# Patient Record
Sex: MALE | Race: BLACK OR AFRICAN AMERICAN | NOT HISPANIC OR LATINO | Employment: FULL TIME | URBAN - METROPOLITAN AREA
[De-identification: names, ages, dates, MRNs, and addresses within clinical notes are randomized per-mention and may not be internally consistent; named-entity substitution may affect disease eponyms.]

---
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
## 2021-03-20 ENCOUNTER — IMMUNIZATION (OUTPATIENT)
Dept: PRIMARY CARE CLINIC | Facility: CLINIC | Age: 21
End: 2021-03-20

## 2021-03-20 DIAGNOSIS — Z23 NEED FOR VACCINATION: Primary | ICD-10-CM

## 2021-03-20 PROCEDURE — 91300 PR SARS-COV- 2 COVID-19 VACCINE, NO PRSV, 30MCG/0.3ML, IM: ICD-10-PCS | Mod: S$GLB,,, | Performed by: INTERNAL MEDICINE

## 2021-03-20 PROCEDURE — 91300 PR SARS-COV- 2 COVID-19 VACCINE, NO PRSV, 30MCG/0.3ML, IM: CPT | Mod: S$GLB,,, | Performed by: INTERNAL MEDICINE

## 2021-03-20 PROCEDURE — 0001A PR IMMUNIZ ADMIN, SARS-COV-2 COVID-19 VACC, 30MCG/0.3ML, 1ST DOSE: CPT | Mod: CV19,S$GLB,, | Performed by: INTERNAL MEDICINE

## 2021-03-20 PROCEDURE — 0001A PR IMMUNIZ ADMIN, SARS-COV-2 COVID-19 VACC, 30MCG/0.3ML, 1ST DOSE: ICD-10-PCS | Mod: CV19,S$GLB,, | Performed by: INTERNAL MEDICINE

## 2021-03-20 RX ADMIN — Medication 0.3 ML: at 08:03

## 2021-04-11 ENCOUNTER — IMMUNIZATION (OUTPATIENT)
Dept: PRIMARY CARE CLINIC | Facility: CLINIC | Age: 21
End: 2021-04-11
Payer: COMMERCIAL

## 2021-04-11 DIAGNOSIS — Z23 NEED FOR VACCINATION: Primary | ICD-10-CM

## 2021-04-11 PROCEDURE — 91300 PR SARS-COV- 2 COVID-19 VACCINE, NO PRSV, 30MCG/0.3ML, IM: CPT | Mod: S$GLB,,, | Performed by: INTERNAL MEDICINE

## 2021-04-11 PROCEDURE — 0002A PR IMMUNIZ ADMIN, SARS-COV-2 COVID-19 VACC, 30MCG/0.3ML, 2ND DOSE: ICD-10-PCS | Mod: CV19,S$GLB,, | Performed by: INTERNAL MEDICINE

## 2021-04-11 PROCEDURE — 91300 PR SARS-COV- 2 COVID-19 VACCINE, NO PRSV, 30MCG/0.3ML, IM: ICD-10-PCS | Mod: S$GLB,,, | Performed by: INTERNAL MEDICINE

## 2021-04-11 PROCEDURE — 0002A PR IMMUNIZ ADMIN, SARS-COV-2 COVID-19 VACC, 30MCG/0.3ML, 2ND DOSE: CPT | Mod: CV19,S$GLB,, | Performed by: INTERNAL MEDICINE

## 2021-04-11 RX ADMIN — Medication 0.3 ML: at 07:04

## 2022-02-15 ENCOUNTER — HOSPITAL ENCOUNTER (EMERGENCY)
Facility: HOSPITAL | Age: 22
Discharge: HOME | End: 2022-02-15
Attending: EMERGENCY MEDICINE
Payer: MEDICAID

## 2022-02-15 VITALS
HEART RATE: 61 BPM | BODY MASS INDEX: 37.5 KG/M2 | DIASTOLIC BLOOD PRESSURE: 89 MMHG | TEMPERATURE: 98 F | RESPIRATION RATE: 18 BRPM | OXYGEN SATURATION: 95 % | SYSTOLIC BLOOD PRESSURE: 127 MMHG | WEIGHT: 300 LBS

## 2022-02-15 DIAGNOSIS — R06.02 SHORTNESS OF BREATH: ICD-10-CM

## 2022-02-15 DIAGNOSIS — J45.21 MILD INTERMITTENT ASTHMA WITH ACUTE EXACERBATION: Primary | ICD-10-CM

## 2022-02-15 LAB
CTP QC/QA: YES
SARS-COV-2 RDRP RESP QL NAA+PROBE: NEGATIVE

## 2022-02-15 PROCEDURE — 25000242 PHARM REV CODE 250 ALT 637 W/ HCPCS: Performed by: PHYSICIAN ASSISTANT

## 2022-02-15 PROCEDURE — 93005 ELECTROCARDIOGRAM TRACING: CPT

## 2022-02-15 PROCEDURE — 94640 AIRWAY INHALATION TREATMENT: CPT

## 2022-02-15 PROCEDURE — 93010 EKG 12-LEAD: ICD-10-PCS | Mod: ,,, | Performed by: INTERNAL MEDICINE

## 2022-02-15 PROCEDURE — 94760 N-INVAS EAR/PLS OXIMETRY 1: CPT

## 2022-02-15 PROCEDURE — 63600175 PHARM REV CODE 636 W HCPCS: Performed by: PHYSICIAN ASSISTANT

## 2022-02-15 PROCEDURE — 99284 EMERGENCY DEPT VISIT MOD MDM: CPT | Mod: 25

## 2022-02-15 PROCEDURE — U0002 COVID-19 LAB TEST NON-CDC: HCPCS | Performed by: PHYSICIAN ASSISTANT

## 2022-02-15 PROCEDURE — 93010 ELECTROCARDIOGRAM REPORT: CPT | Mod: ,,, | Performed by: INTERNAL MEDICINE

## 2022-02-15 RX ORDER — PREDNISONE 20 MG/1
40 TABLET ORAL DAILY
Qty: 10 TABLET | Refills: 0 | Status: SHIPPED | OUTPATIENT
Start: 2022-02-15 | End: 2022-02-20

## 2022-02-15 RX ORDER — PREDNISONE 20 MG/1
60 TABLET ORAL
Status: COMPLETED | OUTPATIENT
Start: 2022-02-15 | End: 2022-02-15

## 2022-02-15 RX ORDER — IPRATROPIUM BROMIDE AND ALBUTEROL SULFATE 2.5; .5 MG/3ML; MG/3ML
3 SOLUTION RESPIRATORY (INHALATION)
Status: COMPLETED | OUTPATIENT
Start: 2022-02-15 | End: 2022-02-15

## 2022-02-15 RX ORDER — ALBUTEROL SULFATE 2.5 MG/.5ML
2.5 SOLUTION RESPIRATORY (INHALATION) EVERY 4 HOURS PRN
Qty: 25 EACH | Refills: 0 | Status: SHIPPED | OUTPATIENT
Start: 2022-02-15 | End: 2022-09-29 | Stop reason: SDUPTHER

## 2022-02-15 RX ORDER — ALBUTEROL SULFATE 90 UG/1
1-2 AEROSOL, METERED RESPIRATORY (INHALATION) EVERY 6 HOURS PRN
Qty: 18 G | Refills: 1 | Status: SHIPPED | OUTPATIENT
Start: 2022-02-15 | End: 2022-09-29 | Stop reason: SDUPTHER

## 2022-02-15 RX ORDER — MOMETASONE FUROATE AND FORMOTEROL FUMARATE DIHYDRATE 200; 5 UG/1; UG/1
2 AEROSOL RESPIRATORY (INHALATION) 2 TIMES DAILY
Qty: 13 G | Refills: 0 | Status: SHIPPED | OUTPATIENT
Start: 2022-02-15 | End: 2022-09-29 | Stop reason: SDUPTHER

## 2022-02-15 RX ADMIN — IPRATROPIUM BROMIDE AND ALBUTEROL SULFATE 3 ML: 2.5; .5 SOLUTION RESPIRATORY (INHALATION) at 09:02

## 2022-02-15 RX ADMIN — PREDNISONE 60 MG: 20 TABLET ORAL at 09:02

## 2022-02-15 NOTE — DISCHARGE INSTRUCTIONS
Please return to the Emergency Department for any new or worsening symptoms including: fever, chest pain, shortness of breath, loss of consciousness, dizziness, weakness, or any other concerns.     Please schedule an appointment for follow up with your Primary Care Doctor as soon as possible for a recheck of your symptoms. If you do not have one, you may contact the one listed on your discharge paperwork or you may also call the [facility] Appointment Desk at [phone] to schedule an appointment with one.     Please take all medication as prescribed.

## 2022-02-15 NOTE — ED TRIAGE NOTES
Pt. Complains of an asthma flare up that has been going on off and on for the past 3 or 4 weeks. Pt. States that he ran out of his medications at home. Pt. Denies any pain at this time. Pt. Does not show any signs of distress at this time.

## 2022-02-15 NOTE — ED PROVIDER NOTES
Encounter Date: 2/15/2022    SCRIBE #1 NOTE: IDanae, am scribing for, and in the presence of,  Kizzy Baig PA-C. I have scribed the following portions of the note - Other sections scribed: HPI, ROS, PE.       History     Chief Complaint   Patient presents with    Asthma     Patient reports feeling short of breath for the last three weeks. Patient states he ran out of his asthma medication, Albuterol, Prednisone, and Dulera. Denies chest pain.      This 21 y.o male, with a medical history of Asthma, and Eczema, presents to the ED c/o shortness of breath, cough and wheezing secondary to an asthma exacerbation. Pt reports that his asthma has been flaring up for the last 3-4 weeks. He states that he ran out of his asthma medications (Prednisone, Albuterol, and Dulera). No recent sick contacts. No use of tobacco. Pt denies fever, chest pain or any other associated symptoms. No alleviating factors.    The history is provided by the patient.     Review of patient's allergies indicates:  No Known Allergies  Past Medical History:   Diagnosis Date    Allergy     Asthma     Eczema      History reviewed. No pertinent surgical history.  History reviewed. No pertinent family history.  Social History     Tobacco Use    Smoking status: Passive Smoke Exposure - Never Smoker    Smokeless tobacco: Never Used   Substance Use Topics    Alcohol use: No    Drug use: No     Review of Systems   Constitutional: Negative for chills and fever.   HENT: Negative for sore throat.    Eyes: Negative for visual disturbance.   Respiratory: Positive for cough, shortness of breath and wheezing.    Cardiovascular: Negative for chest pain.   Gastrointestinal: Negative for abdominal pain, diarrhea, nausea and vomiting.   Genitourinary: Negative for dysuria.   Musculoskeletal: Negative for neck pain.   Skin: Negative for rash.   Allergic/Immunologic: Negative for immunocompromised state.   Neurological: Negative for headaches.    Psychiatric/Behavioral: Negative for dysphoric mood.       Physical Exam     Initial Vitals [02/15/22 0835]   BP Pulse Resp Temp SpO2   (!) 148/70 84 19 98.1 °F (36.7 °C) 96 %      MAP       --         Physical Exam    Nursing note and vitals reviewed.  Constitutional: He appears well-developed and well-nourished. He is not diaphoretic. No distress.   HENT:   Head: Normocephalic and atraumatic.   Mouth/Throat: Oropharynx is clear and moist. No oropharyngeal exudate.   Eyes: Conjunctivae and EOM are normal. Pupils are equal, round, and reactive to light.   Neck: Neck supple.   Normal range of motion.  Cardiovascular: Normal rate, regular rhythm, normal heart sounds and intact distal pulses.   Pulmonary/Chest: No respiratory distress. He has wheezes. He has no rales.   Mild expiratory wheezes heard in all 4 lung fields.   Musculoskeletal:         General: No tenderness or edema. Normal range of motion.      Cervical back: Normal range of motion and neck supple.     Neurological: He is alert and oriented to person, place, and time. GCS score is 15. GCS eye subscore is 4. GCS verbal subscore is 5. GCS motor subscore is 6.   Skin: Skin is warm and dry.   Psychiatric: He has a normal mood and affect. Thought content normal.         ED Course   Procedures  Labs Reviewed   SARS-COV-2 RDRP GENE          Imaging Results    None          Medications   albuterol-ipratropium 2.5 mg-0.5 mg/3 mL nebulizer solution 3 mL (3 mLs Nebulization Given 2/15/22 0943)   predniSONE tablet 60 mg (60 mg Oral Given 2/15/22 0930)           APC / Resident Notes:   21 year old patient presenting with sob and wheezing consistent with an asthma exacerbation. Patient has no altered mental status, improved respiratory status, and no signs of impending ventilator failure. Patient was given 2.5 mg of albuterol and 0.5 mg of ipratropium. He was provided refills of his asthma medications in addition to Prednisone 40 mg x 5 days.    Also considered but  less likely:   Pneumonia: no fever, unilateral ronchi, or signs concerning of pneumonia  COPD: no history of COPD  ACS: presentation atypical and more consistent with asthma  Pneumothorax: bilateral breath sounds and wheezing bilaterally  CHF: no signs of fluid overload and no history  COVID-19: negative    Return precautions given, patient understands and agrees with plan. All questions answered.  Instructed to follow up with PCP.          Scribe Attestation:   Scribe #1: I performed the above scribed service and the documentation accurately describes the services I performed. I attest to the accuracy of the note.                 Clinical Impression:   Final diagnoses:  [J45.21] Mild intermittent asthma with acute exacerbation (Primary)          ED Disposition Condition    Discharge Stable        ED Prescriptions     Medication Sig Dispense Start Date End Date Auth. Provider    mometasone-formoterol (DULERA) 200-5 mcg/actuation inhaler Inhale 2 puffs into the lungs 2 (two) times daily. Controller 13 g 2/15/2022 2/15/2023 Kizzy Baig PA-C    albuterol sulfate 2.5 mg/0.5 mL Nebu Take 2.5 mg by nebulization every 4 (four) hours as needed (Wheezing). Rescue 25 each 2/15/2022 2/15/2023 Kizzy Baig PA-C    albuterol (PROVENTIL/VENTOLIN HFA) 90 mcg/actuation inhaler Inhale 1-2 puffs into the lungs every 6 (six) hours as needed for Wheezing. Rescue 18 g 2/15/2022 2/15/2023 Kizzy Baig PA-C    predniSONE (DELTASONE) 20 MG tablet Take 2 tablets (40 mg total) by mouth once daily. for 5 days 10 tablet 2/15/2022 2/20/2022 Kizzy Baig PA-C        Follow-up Information     Follow up With Specialties Details Why Contact Info    Shon Oconnor Jr., MD Family Medicine Schedule an appointment as soon as possible for a visit   [address]  [address] [facility] [address]  [phone]      [facility] Emergency Dept Emergency Medicine  If symptoms worsen [address]  [address]  [facility] [id number] [phone]           I, Kizzy Baig PA-C, personally performed the services described in this documentation. All medical record entries made by the scribe were at my direction and in my presence. I have reviewed the chart and agree that the record reflects my personal performance and is accurate and complete.     Kizzy Baig PA-C  02/15/22 1139

## 2022-09-28 ENCOUNTER — TELEPHONE (OUTPATIENT)
Dept: FAMILY MEDICINE | Facility: CLINIC | Age: 22
End: 2022-09-28
Payer: MEDICAID

## 2022-09-28 NOTE — TELEPHONE ENCOUNTER
----- Message from Kasia Srinivasan sent at 9/28/2022 11:04 AM CDT -----  .Type:  Patient Returning Call    Who Called:  self     Who Left Message for Patient: no name     Does the patient know what this is regarding?: was offered 9am appt tomorrow states he will take it     Would the patient rather a call back or a response via [facility]?     Best Call Back Number: .[phone]

## 2022-09-29 ENCOUNTER — OFFICE VISIT (OUTPATIENT)
Dept: FAMILY MEDICINE | Facility: CLINIC | Age: 22
End: 2022-09-29
Payer: MEDICAID

## 2022-09-29 VITALS
RESPIRATION RATE: 20 BRPM | HEIGHT: 75 IN | SYSTOLIC BLOOD PRESSURE: 124 MMHG | TEMPERATURE: 98 F | HEART RATE: 85 BPM | DIASTOLIC BLOOD PRESSURE: 82 MMHG | WEIGHT: 315 LBS | OXYGEN SATURATION: 97 % | BODY MASS INDEX: 39.17 KG/M2

## 2022-09-29 DIAGNOSIS — Z23 NEED FOR INFLUENZA VACCINATION: ICD-10-CM

## 2022-09-29 DIAGNOSIS — J45.20 MILD INTERMITTENT ASTHMA WITHOUT COMPLICATION: Primary | ICD-10-CM

## 2022-09-29 DIAGNOSIS — Z00.00 ANNUAL PHYSICAL EXAM: ICD-10-CM

## 2022-09-29 PROCEDURE — 99214 OFFICE O/P EST MOD 30 MIN: CPT | Mod: S$PBB,,, | Performed by: FAMILY MEDICINE

## 2022-09-29 PROCEDURE — 90471 IMMUNIZATION ADMIN: CPT | Mod: PBBFAC,PO

## 2022-09-29 PROCEDURE — 3079F DIAST BP 80-89 MM HG: CPT | Mod: CPTII,,, | Performed by: FAMILY MEDICINE

## 2022-09-29 PROCEDURE — 99999 PR PBB SHADOW E&M-EST. PATIENT-LVL III: ICD-10-PCS | Mod: PBBFAC,,, | Performed by: FAMILY MEDICINE

## 2022-09-29 PROCEDURE — 3008F BODY MASS INDEX DOCD: CPT | Mod: CPTII,,, | Performed by: FAMILY MEDICINE

## 2022-09-29 PROCEDURE — 99999 PR PBB SHADOW E&M-EST. PATIENT-LVL III: CPT | Mod: PBBFAC,,, | Performed by: FAMILY MEDICINE

## 2022-09-29 PROCEDURE — 99213 OFFICE O/P EST LOW 20 MIN: CPT | Mod: PBBFAC,PO | Performed by: FAMILY MEDICINE

## 2022-09-29 PROCEDURE — 3079F PR MOST RECENT DIASTOLIC BLOOD PRESSURE 80-89 MM HG: ICD-10-PCS | Mod: CPTII,,, | Performed by: FAMILY MEDICINE

## 2022-09-29 PROCEDURE — 3074F SYST BP LT 130 MM HG: CPT | Mod: CPTII,,, | Performed by: FAMILY MEDICINE

## 2022-09-29 PROCEDURE — 3074F PR MOST RECENT SYSTOLIC BLOOD PRESSURE < 130 MM HG: ICD-10-PCS | Mod: CPTII,,, | Performed by: FAMILY MEDICINE

## 2022-09-29 PROCEDURE — 1159F MED LIST DOCD IN RCRD: CPT | Mod: CPTII,,, | Performed by: FAMILY MEDICINE

## 2022-09-29 PROCEDURE — 1159F PR MEDICATION LIST DOCUMENTED IN MEDICAL RECORD: ICD-10-PCS | Mod: CPTII,,, | Performed by: FAMILY MEDICINE

## 2022-09-29 PROCEDURE — 3008F PR BODY MASS INDEX (BMI) DOCUMENTED: ICD-10-PCS | Mod: CPTII,,, | Performed by: FAMILY MEDICINE

## 2022-09-29 PROCEDURE — 99214 PR OFFICE/OUTPT VISIT, EST, LEVL IV, 30-39 MIN: ICD-10-PCS | Mod: S$PBB,,, | Performed by: FAMILY MEDICINE

## 2022-09-29 RX ORDER — ALBUTEROL SULFATE 2.5 MG/.5ML
2.5 SOLUTION RESPIRATORY (INHALATION) EVERY 4 HOURS PRN
Qty: 25 EACH | Refills: 0 | Status: SHIPPED | OUTPATIENT
Start: 2022-09-29 | End: 2022-09-29 | Stop reason: SDUPTHER

## 2022-09-29 RX ORDER — ALBUTEROL SULFATE 90 UG/1
1-2 AEROSOL, METERED RESPIRATORY (INHALATION) EVERY 6 HOURS PRN
Qty: 18 G | Refills: 1 | Status: SHIPPED | OUTPATIENT
Start: 2022-09-29 | End: 2022-09-29 | Stop reason: SDUPTHER

## 2022-09-29 RX ORDER — MOMETASONE FUROATE AND FORMOTEROL FUMARATE DIHYDRATE 200; 5 UG/1; UG/1
2 AEROSOL RESPIRATORY (INHALATION) 2 TIMES DAILY
Qty: 13 G | Refills: 5 | Status: SHIPPED | OUTPATIENT
Start: 2022-09-29 | End: 2023-11-04 | Stop reason: SDUPTHER

## 2022-09-29 RX ORDER — ALBUTEROL SULFATE 2.5 MG/.5ML
2.5 SOLUTION RESPIRATORY (INHALATION) EVERY 6 HOURS PRN
Qty: 25 EACH | Refills: 5 | Status: SHIPPED | OUTPATIENT
Start: 2022-09-29 | End: 2023-11-04 | Stop reason: SDUPTHER

## 2022-09-29 RX ORDER — METHYLPREDNISOLONE 4 MG/1
TABLET ORAL
Qty: 1 EACH | Refills: 0 | Status: SHIPPED | OUTPATIENT
Start: 2022-09-29 | End: 2022-10-20

## 2022-09-29 RX ORDER — ALBUTEROL SULFATE 90 UG/1
1-2 AEROSOL, METERED RESPIRATORY (INHALATION) EVERY 6 HOURS PRN
Qty: 18 G | Refills: 5 | Status: SHIPPED | OUTPATIENT
Start: 2022-09-29 | End: 2023-09-29

## 2022-09-29 NOTE — PROGRESS NOTES
Subjective:       Patient ID: Ermias Naidu is a 22 y.o. male.    Chief Complaint: Establish Care and Annual Exam      HPI  21 yo male presents for asthma. States he has increased sob. Denies any f/c. Would like refills on his inhalers. States she has a sensation of something crawling on his skin. Endorses eye itching as well.     Review of Systems   Constitutional: Negative.    HENT: Negative.     Respiratory: Negative.     Cardiovascular: Negative.    Gastrointestinal: Negative.    Endocrine: Negative.    Genitourinary: Negative.    Musculoskeletal: Negative.    Neurological: Negative.    Psychiatric/Behavioral: Negative.          Past Medical History:   Diagnosis Date    Allergy     Asthma     Eczema      No past surgical history on file.  No family history on file.  Social History     Socioeconomic History    Marital status: Single   Tobacco Use    Smoking status: Passive Smoke Exposure - Never Smoker    Smokeless tobacco: Never   Substance and Sexual Activity    Alcohol use: No    Drug use: No       Current Outpatient Medications:     cetirizine (ZYRTEC) 5 MG tablet, Take 5 mg by mouth once daily., Disp: , Rfl:     albuterol (PROVENTIL/VENTOLIN HFA) 90 mcg/actuation inhaler, Inhale 1-2 puffs into the lungs every 6 (six) hours as needed for Wheezing. Rescue, Disp: 18 g, Rfl: 5    albuterol sulfate 2.5 mg/0.5 mL Nebu, Take 2.5 mg by nebulization every 6 (six) hours as needed (Wheezing). Rescue, Disp: 25 each, Rfl: 5    methylPREDNISolone (MEDROL DOSEPACK) 4 mg tablet, use as directed, Disp: 1 each, Rfl: 0    mometasone-formoterol (DULERA) 200-5 mcg/actuation inhaler, Inhale 2 puffs into the lungs 2 (two) times daily. Controller, Disp: 13 g, Rfl: 5   Objective:      Vitals:    09/29/22 0844   BP: 124/82   BP Location: Left arm   Patient Position: Sitting   BP Method: Large (Manual)   Pulse: 85   Resp: 20   Temp: 98.1 °F (36.7 °C)   TempSrc: Oral   SpO2: 97%   Weight: (!) 180 kg (396 lb 13.3 oz)   Height:  "6' 3" (1.905 m)       Physical Exam  Constitutional:       General: He is not in acute distress.  HENT:      Head: Normocephalic and atraumatic.   Eyes:      Conjunctiva/sclera: Conjunctivae normal.   Cardiovascular:      Rate and Rhythm: Normal rate and regular rhythm.      Heart sounds: Normal heart sounds. No murmur heard.    No friction rub. No gallop.   Pulmonary:      Effort: Pulmonary effort is normal.      Breath sounds: Wheezing present. No rales.   Musculoskeletal:      Cervical back: Neck supple.   Skin:     General: Skin is warm and dry.   Neurological:      Mental Status: He is alert and oriented to person, place, and time.   Psychiatric:         Behavior: Behavior normal.         Thought Content: Thought content normal.         Judgment: Judgment normal.          Assessment:       1. Mild intermittent asthma without complication    2. Annual physical exam    3. Need for influenza vaccination          Plan:       Mild intermittent asthma without complication  -     Discontinue: albuterol (PROVENTIL/VENTOLIN HFA) 90 mcg/actuation inhaler; Inhale 1-2 puffs into the lungs every 6 (six) hours as needed for Wheezing. Rescue  Dispense: 18 g; Refill: 1  -     Discontinue: albuterol sulfate 2.5 mg/0.5 mL Nebu; Take 2.5 mg by nebulization every 4 (four) hours as needed (Wheezing). Rescue  Dispense: 25 each; Refill: 0  -     mometasone-formoterol (DULERA) 200-5 mcg/actuation inhaler; Inhale 2 puffs into the lungs 2 (two) times daily. Controller  Dispense: 13 g; Refill: 5  -     albuterol (PROVENTIL/VENTOLIN HFA) 90 mcg/actuation inhaler; Inhale 1-2 puffs into the lungs every 6 (six) hours as needed for Wheezing. Rescue  Dispense: 18 g; Refill: 5  -     albuterol sulfate 2.5 mg/0.5 mL Nebu; Take 2.5 mg by nebulization every 6 (six) hours as needed (Wheezing). Rescue  Dispense: 25 each; Refill: 5  -     methylPREDNISolone (MEDROL DOSEPACK) 4 mg tablet; use as directed  Dispense: 1 each; Refill: 0  -     Ambulatory " referral/consult to Allergy; Future; Expected date: 10/06/2022    Annual physical exam  -     CBC Auto Differential; Future; Expected date: 09/29/2022  -     Comprehensive Metabolic Panel; Future; Expected date: 09/29/2022  -     Lipid Panel; Future; Expected date: 09/29/2022  -     TSH; Future; Expected date: 09/29/2022  -     Vitamin D; Future; Expected date: 09/29/2022  -     Hemoglobin A1C; Future; Expected date: 09/29/2022  -     Hepatitis C Antibody; Future; Expected date: 09/29/2022  -     HIV 1/2 Ag/Ab (4th Gen); Future; Expected date: 09/29/2022    Need for influenza vaccination  -     Influenza - Quadrivalent (PF)    Will try steroids for pruritis. Advised pt to try otc allery meds. F/u in 2 months for annual.        Future Appointments   Date Time Provider Department Center   11/11/2022  8:00 AM LAB, [facility] [place]   11/15/2022  7:40 AM Saul Washburn MD [facility] MED [place]       Patient note was created using [employer].  Any errors in syntax or even information may not have been identified and edited on initial review prior to signing this note.

## 2022-09-30 ENCOUNTER — TELEPHONE (OUTPATIENT)
Dept: FAMILY MEDICINE | Facility: CLINIC | Age: 22
End: 2022-09-30
Payer: MEDICAID

## 2022-12-22 ENCOUNTER — HOSPITAL ENCOUNTER (EMERGENCY)
Facility: HOSPITAL | Age: 22
Discharge: HOME | End: 2022-12-22
Attending: EMERGENCY MEDICINE
Payer: MEDICAID

## 2022-12-22 VITALS
WEIGHT: 315 LBS | SYSTOLIC BLOOD PRESSURE: 137 MMHG | HEART RATE: 95 BPM | TEMPERATURE: 99 F | BODY MASS INDEX: 39.17 KG/M2 | HEIGHT: 75 IN | RESPIRATION RATE: 18 BRPM | DIASTOLIC BLOOD PRESSURE: 75 MMHG | OXYGEN SATURATION: 95 %

## 2022-12-22 DIAGNOSIS — L40.0 PLAQUE PSORIASIS: Primary | ICD-10-CM

## 2022-12-22 PROCEDURE — 99284 EMERGENCY DEPT VISIT MOD MDM: CPT

## 2022-12-22 RX ORDER — BETAMETHASONE DIPROPIONATE 0.5 MG/G
CREAM TOPICAL 2 TIMES DAILY
Qty: 45 G | Refills: 0 | Status: SHIPPED | OUTPATIENT
Start: 2022-12-22 | End: 2022-12-22 | Stop reason: SDUPTHER

## 2022-12-22 RX ORDER — CALCITRIOL 3 UG/G
OINTMENT TOPICAL 2 TIMES DAILY
Qty: 100 G | Refills: 0 | Status: SHIPPED | OUTPATIENT
Start: 2022-12-22 | End: 2023-12-22

## 2022-12-22 RX ORDER — CALCITRIOL 3 UG/G
OINTMENT TOPICAL 2 TIMES DAILY
Qty: 100 G | Refills: 0 | Status: SHIPPED | OUTPATIENT
Start: 2022-12-22 | End: 2022-12-22 | Stop reason: SDUPTHER

## 2022-12-22 RX ORDER — BETAMETHASONE DIPROPIONATE 0.5 MG/G
CREAM TOPICAL 2 TIMES DAILY
Qty: 45 G | Refills: 0 | Status: SHIPPED | OUTPATIENT
Start: 2022-12-22

## 2022-12-22 NOTE — ED TRIAGE NOTES
Pt. Reports for the past few weeks he has been having a rash on his bilat nipples. Pt. Reports he has been putting peroxide to area and trying to to self care. Pt. Is noted with scabs and irritation to site.

## 2022-12-22 NOTE — ED PROVIDER NOTES
Encounter Date: 12/22/2022       History     Chief Complaint   Patient presents with    Rash     Pt reports flaky dry skin with yellow drainage x2wks     Ermias Naidu is a 22-year-old male with past medical history of asthma who presents to the emergency department with chief complaint of rash.  Over the last 2-3 weeks, he has developed a slowly progressive rash, primarily on bilateral areolas.  He also has smaller patches of this rash on his lower abdomen and bilateral upper extremities.  Reports a moderately painful and itchy rash with occasional clear/yellow discharge.  Denies any local erythema, warmth, or swelling.  He has not used any new medications, soaps, lotions, detergents, and denies any known environmental exposures to any new irritants.  He has been using hydrogen peroxide and aloe vera attempt to relieve his symptoms with no relief.  He denies fever, chills, nausea, vomiting, diarrhea, chest pain, shortness of breath, or any other symptoms.  For this patient's asthma, he takes albuterol Singulair with prednisone p.r.n., he is unsure of the dosage.  Has no known drug allergies.    The history is provided by the patient. No  was used.   Review of patient's allergies indicates:  No Known Allergies  Past Medical History:   Diagnosis Date    Allergy     Asthma     Eczema      History reviewed. No pertinent surgical history.  History reviewed. No pertinent family history.  Social History     Tobacco Use    Smoking status: Passive Smoke Exposure - Never Smoker    Smokeless tobacco: Never   Substance Use Topics    Alcohol use: No    Drug use: No     Review of Systems   Constitutional:  Negative for chills, fatigue and fever.   HENT:  Negative for ear discharge, ear pain and sore throat.    Eyes:  Negative for pain and discharge.   Respiratory:  Negative for shortness of breath and wheezing.    Cardiovascular:  Negative for chest pain and palpitations.   Gastrointestinal:  Negative for  abdominal pain, diarrhea, nausea and vomiting.   Genitourinary:  Negative for dysuria.   Musculoskeletal:  Negative for arthralgias, back pain, joint swelling and myalgias.   Skin:  Positive for rash. Negative for color change and pallor.   Neurological:  Negative for weakness and headaches.     Physical Exam     Initial Vitals [12/22/22 1114]   BP Pulse Resp Temp SpO2   137/75 95 18 98.6 °F (37 °C) 95 %      MAP       --         Physical Exam    Nursing note and vitals reviewed.  Constitutional: Vital signs are normal. He appears well-developed and well-nourished. He is cooperative. He does not appear ill. No distress.   HENT:   Head: Normocephalic and atraumatic.   Right Ear: External ear normal.   Left Ear: External ear normal.   Nose: Nose normal.   Eyes: Conjunctivae and EOM are normal.   Neck: Phonation normal.   Normal range of motion.  Cardiovascular:  Normal rate and regular rhythm.           No murmur heard.  Pulmonary/Chest: Effort normal. No respiratory distress.   Abdominal: Abdomen is flat. He exhibits no distension. There is no abdominal tenderness.   Musculoskeletal:      Cervical back: Normal range of motion.     Neurological: He is alert and oriented to person, place, and time. GCS eye subscore is 4. GCS verbal subscore is 5. GCS motor subscore is 6.   Skin: Skin is warm and dry. Capillary refill takes less than 2 seconds. Rash noted.        Patient with rash of hyperkeratotic, mildly erythematous lesions to bilateral nipples as well as on abdomen bilateral upper extremities.  Photos of bilateral nipples in media section.       ED Course   Procedures  Labs Reviewed - No data to display       Imaging Results    None          Medications - No data to display  Medical Decision Making:   Initial Assessment:   22-year-old male presenting to the emergency department with chief complaint of rash.  Differential Diagnosis:   Differential diagnosis is broad and includes but is not limited to allergic,  atopic, or contact dermatitis, cellulitis, erysipelas, monkey pox, small pox, chickenpox, tinea, psoriasis, burn, impetigo, drug rash, toxic epidermal necrolysis, Anderson-Jhonathan syndrome, melanoma, actinic keratosis, seborrheic keratosis, allergic reaction, dermatitis herpetiformis  ED Management:  Patient presenting with rash that has slowly developed over the last weeks.  Primarily on his bilateral areolas, some patches on his abdomen and upper extremities, although less severe than on his areolas.  Rash is composed of hyperkeratotic plaques with mild surrounding erythema. No active drainage or discharge at this time.  No signs of localized infection including warmth or purulent discharge.  No signs of systemic infection, no fever, no tachycardia, no tachypnea.  Appearance of the rash is most consistent with plaque psoriasis.  With no red flag signs such as fever, toxic appearance, hypotension, mucosal lesions, or immunosuppression, patient is safe to be discharged home with follow-up with dermatology.  Prescribed betamethasone and calcitriol creams for treatment of psoriasis.  Photos taken an added to media section to track treatment response when he follows up with dermatology.    Return precautions were discussed, all patient questions were answered, and the patient was agreeable to the plan care.  He was discharged home in stable condition and will follow up with his primary care provider or Dermatology if his symptoms worsen or do not improve                        Clinical Impression:   Final diagnoses:  [L40.0] Plaque psoriasis (Primary)        ED Disposition Condition    Discharge Stable          ED Prescriptions       Medication Sig Dispense Start Date End Date Auth. Provider    betamethasone dipropionate 0.05 % cream  (Status: Discontinued) Apply topically 2 (two) times daily. 45 g 12/22/2022 12/22/2022 Emre Nuñez PA-C    calcitrioL (VECTICAL) 3 mcg/gram ointment  (Status: Discontinued) Apply  topically 2 (two) times daily. 100 g 12/22/2022 12/22/2022 Emre Nuñez PA-C    betamethasone dipropionate 0.05 % cream Apply topically 2 (two) times daily. 45 g 12/22/2022 -- Emre Nuñez PA-C    calcitrioL (VECTICAL) 3 mcg/gram ointment Apply topically 2 (two) times daily. 100 g 12/22/2022 12/22/2023 Emre Nuñez PA-C          Follow-up Information       Follow up With Specialties Details Why Contact Info    Saul Washburn MD Family Medicine Schedule an appointment as soon as possible for a visit  If symptoms worsen [address] [place] [address]  [phone]      [facility] Emergency Dept Emergency Medicine Go to  If symptoms worsen [address] [facility] [address] [phone]             Emre Nuñez PA-C  12/22/22 1224

## 2022-12-22 NOTE — DISCHARGE INSTRUCTIONS
I have put in a referral for Dermatology.  They will be calling you to make an appointment.  In the meantime, may use the 2 creams prescribed twice per day to help with your symptoms.  You should also use a moisturizing cream or lotion after you have gotten out of the shower, as this may help with your symptoms as well.    Thank you for coming to our Emergency Department today. It is important to remember that some problems are difficult to diagnose and may not be found during your first visit. Be sure to follow up with your primary care doctor and review any labs/imaging that was performed with them. If you do not have a primary care doctor, you may contact the one listed on your discharge paperwork or you may also call the [facility] Appointment Desk at [phone] to schedule an appointment with one.     All medications may potentially have side effects and it is impossible to predict which medications may give you side effects. If you feel that you are having a negative effect of any medication you should immediately stop taking them and seek medical attention.    Return to the ER with any questions/concerns, new/concerning symptoms, worsening or failure to improve. Do not drive or make any important decisions for 24 hours if you have received any pain medications, sedatives or mood altering drugs during your ER visit.

## 2023-01-03 ENCOUNTER — IMMUNIZATION (OUTPATIENT)
Dept: OBSTETRICS AND GYNECOLOGY | Facility: CLINIC | Age: 23
End: 2023-01-03
Payer: MEDICAID

## 2023-01-03 DIAGNOSIS — Z23 NEED FOR VACCINATION: Primary | ICD-10-CM

## 2023-01-03 PROCEDURE — 0124A COVID-19, MRNA, LNP-S, BIVALENT BOOSTER, PF, 30 MCG/0.3 ML DOSE: CPT | Mod: PBBFAC

## 2023-01-03 PROCEDURE — 91312 COVID-19, MRNA, LNP-S, BIVALENT BOOSTER, PF, 30 MCG/0.3 ML DOSE: CPT | Mod: PBBFAC

## 2023-03-22 ENCOUNTER — TELEPHONE (OUTPATIENT)
Dept: FAMILY MEDICINE | Facility: CLINIC | Age: 23
End: 2023-03-22
Payer: MEDICAID

## 2023-03-22 ENCOUNTER — TELEPHONE (OUTPATIENT)
Dept: DERMATOLOGY | Facility: CLINIC | Age: 23
End: 2023-03-22
Payer: MEDICAID

## 2023-03-22 NOTE — TELEPHONE ENCOUNTER
----- Message from Cristina Anderson sent at 3/22/2023  8:16 AM CDT -----  Regarding: Refill  Contact: ERMIAS WEI [[id number]]  Type:  RX Refill Request    Who Called: Ermias Wei     Refill or New Rx:Refill   RX Name and Strength:albuterol sulfate 2.5 mg/0.5 mL Nebu   How is the patient currently taking it? (ex. 1XDay):every 6hrs   Is this a 30 day or 90 day RX:25 e   Preferred Pharmacy with phone number:[id number] [place] [facility] [address] [facility] [address] AT [facility]   Local or Mail Order:Local   Ordering Provider:Dr Thomson   Would the patient rather a call back or a response via [facility]? Call back   Best Call Back Number:Home Phone      [phone]  Work Phone      Not on file.  Mobile          [phone]      Additional Information: pt is also having an issues getting in to DERM dept, he has a referral from ER  and would like a call back in regards , Please advise

## 2023-03-22 NOTE — TELEPHONE ENCOUNTER
----- Message from Cristinaluna Anderson sent at 3/22/2023  8:12 AM CDT -----  Regarding: Requesting an appt  Contact: : Megan- Mother  Name of Who is Calling: Megan- Mother           What is the request in detail:   She states she is requesting a appt, in person or virtual  . Pt has a referral, the reason for visit is  Plaque psoriasis. She is requesting a call back urgent in regards , Please advise         Can the clinic reply by [facility]:   No         What Number to Call Back if not in [facility]: Home Phone      [phone]  Work Phone      Not on file.  Mobile          [phone]

## 2023-04-04 ENCOUNTER — HOSPITAL ENCOUNTER (EMERGENCY)
Facility: HOSPITAL | Age: 23
Discharge: HOME | End: 2023-04-04
Attending: EMERGENCY MEDICINE
Payer: MEDICAID

## 2023-04-04 VITALS
TEMPERATURE: 99 F | OXYGEN SATURATION: 98 % | WEIGHT: 315 LBS | DIASTOLIC BLOOD PRESSURE: 64 MMHG | HEIGHT: 76 IN | SYSTOLIC BLOOD PRESSURE: 139 MMHG | BODY MASS INDEX: 38.36 KG/M2 | RESPIRATION RATE: 18 BRPM | HEART RATE: 82 BPM

## 2023-04-04 DIAGNOSIS — L30.9 ECZEMA, UNSPECIFIED TYPE: Primary | ICD-10-CM

## 2023-04-04 PROCEDURE — 99283 EMERGENCY DEPT VISIT LOW MDM: CPT

## 2023-04-04 RX ORDER — TRIAMCINOLONE ACETONIDE 1 MG/G
OINTMENT TOPICAL 2 TIMES DAILY
Qty: 80 G | Refills: 8 | Status: SHIPPED | OUTPATIENT
Start: 2023-04-04

## 2023-04-04 NOTE — DISCHARGE INSTRUCTIONS
Use Cetaphil cream or Aveeno cream twice daily.  Please follow-up with the dermatologist as already scheduled.  Use the triamcinolone on the areas with a rash.  It is very important that you keep your skin hydrated.  You can use Vaseline in addition to creams.

## 2023-04-04 NOTE — ED PROVIDER NOTES
"Encounter Date: 4/4/2023    SCRIBE #1 NOTE: I, Danae Garcia, am scribing for, and in the presence of,  Keyanna Waters PA-C. I have scribed the following portions of the note - Other sections scribed: HPI, ROS.     History     Chief Complaint   Patient presents with    Rash     Pt c/o generalized rash "for a long time" Pt with hx of eczema. Pt with BLE edema x 2-3 weeks. Denies any SOB      This 22 y.o male, with a medical history of Allergy, Asthma, and Eczema, presents to the ED accompanied by his mother c/o a constant, moderate (6/10) itchy generalized rash. Pt reports that he visited this ED a couple of months ago for a rash to the bilateral chest and was instructed to follow up with dermatology. He states that he has a virtual appointment scheduled for 4/13/23, however, the rash has persisted on his chest and has since spread to the bilateral lower legs and thighs where it has worsened. He notes that he has been using Aloe Vera and a previously prescribed eczema cream for treatment with only minimal improvement. Additionally, he states that since the rash has spread to his bilateral legs he has been experiencing swelling and pain to the bilateral calves and feet for the last 2x weeks. Pt reports that he has a chronic history of asthma and always has rashes, but notes that they have never been this severe or unmanageable. There are no other associated symptoms at this time. No alleviating factors.    The history is provided by the patient and a parent.   Review of patient's allergies indicates:  No Known Allergies  Past Medical History:   Diagnosis Date    Allergy     Asthma     Eczema      History reviewed. No pertinent surgical history.  History reviewed. No pertinent family history.  Social History     Tobacco Use    Smoking status: Never     Passive exposure: Yes    Smokeless tobacco: Never   Substance Use Topics    Alcohol use: No    Drug use: No     Review of Systems   Constitutional:  Negative for " fever.   HENT:  Negative for sore throat.    Respiratory:  Negative for shortness of breath.    Cardiovascular:  Positive for leg swelling (to the bilateral lower calves and feet). Negative for chest pain.   Gastrointestinal:  Negative for nausea.   Genitourinary:  Negative for dysuria.   Musculoskeletal:  Negative for back pain.   Skin:  Positive for rash (generalized).   Neurological:  Negative for weakness.     Physical Exam     Initial Vitals [04/04/23 0945]   BP Pulse Resp Temp SpO2   139/64 82 18 98.8 °F (37.1 °C) 98 %      MAP       --         Physical Exam    Nursing note and vitals reviewed.  Constitutional: He appears well-developed and well-nourished. He is not diaphoretic. No distress.   HENT:   Head: Normocephalic and atraumatic.   Eyes: EOM are normal. Pupils are equal, round, and reactive to light.   Neck: Neck supple.   Normal range of motion.  Cardiovascular:  Normal rate.           Pulmonary/Chest: Breath sounds normal. No respiratory distress. He has no wheezes. He has no rhonchi. He has no rales.   Abdominal: Abdomen is soft. Bowel sounds are normal. He exhibits no distension. There is no abdominal tenderness. There is no rebound and no guarding.   Musculoskeletal:         General: No tenderness or edema. Normal range of motion.      Cervical back: Normal range of motion and neck supple.     Neurological: He is alert and oriented to person, place, and time.   Skin: Skin is warm. Capillary refill takes less than 2 seconds.   This patient has a hyperpigmented rash noted throughout the body.  Please see pictures for further description.                     ED Course   Procedures  Labs Reviewed - No data to display       Imaging Results    None          Medications - No data to display        APC / Resident Notes:   This is an emergent evaluation of a 22-year-old male who presents to the emergency department with signs and symptoms of eczema.  Physical exam is consistent with eczema.  Please see  pictures for further description.  He was treated with Kenalog.  Hydration using creams also encouraged.  He has a dermatology appointment set for next week.  Signs and symptoms of worsening were thoroughly reviewed with him.  He verbalized understanding and agreement.  He is currently safe and stable for discharge at this time.   Scribe Attestation:   Scribe #1: I performed the above scribed service and the documentation accurately describes the services I performed. I attest to the accuracy of the note.                 I, Keyanna Waters PA-C, personally performed the services described in this documentation. All medical record entries made by the scribe were at my direction and in my presence. I have reviewed the chart and agree that the record reflects my personal performance and is accurate and complete.   Clinical Impression:   Final diagnoses:  [L30.9] Eczema, unspecified type (Primary)        ED Disposition Condition    Discharge Stable          ED Prescriptions       Medication Sig Dispense Start Date End Date Auth. Provider    triamcinolone acetonide 0.1% (KENALOG) 0.1 % ointment Apply topically 2 (two) times daily. 80 g 4/4/2023 -- Keyanna Waters PA-C          Follow-up Information       Follow up With Specialties Details Why Contact Info    Saul Washburn MD Family Medicine   [address] [place] [address]  [phone]               Keyanna Waters PA-C  04/04/23 5806

## 2023-04-04 NOTE — ED TRIAGE NOTES
Patient called regarding her appt with the Hematologist- she states that she talked to you and you told her the plan was to hold off for a few months and repeat labs. Should she be seen on 7/22/19 with Dr. Mitchell or cancel? Please advise.    Pt presents to the ED with complaints of generalized rash and BLE edema for the past 2 weeks   Pt endorses a HX of eczema  Pt AAOX4

## 2023-04-24 ENCOUNTER — PATIENT MESSAGE (OUTPATIENT)
Dept: DERMATOLOGY | Facility: CLINIC | Age: 23
End: 2023-04-24

## 2023-06-20 ENCOUNTER — PATIENT MESSAGE (OUTPATIENT)
Dept: RESEARCH | Facility: HOSPITAL | Age: 23
End: 2023-06-20
Payer: MEDICAID

## 2023-06-27 ENCOUNTER — PATIENT MESSAGE (OUTPATIENT)
Dept: RESEARCH | Facility: HOSPITAL | Age: 23
End: 2023-06-27
Payer: MEDICAID

## 2023-07-19 ENCOUNTER — PATIENT MESSAGE (OUTPATIENT)
Dept: RESEARCH | Facility: HOSPITAL | Age: 23
End: 2023-07-19
Payer: MEDICAID

## 2023-10-10 ENCOUNTER — PATIENT OUTREACH (OUTPATIENT)
Dept: ADMINISTRATIVE | Facility: HOSPITAL | Age: 23
End: 2023-10-10
Payer: MEDICAID

## 2023-10-10 NOTE — PROGRESS NOTES
Population Health Chart Review & Patient Outreach Details:     Reason for Outreach Encounter:     [x]  Non-Compliant Report   []  Payor Report ([employer], PHN, BCBS, MSSP, MCIP, UHC, etc.)   []  Pre-Visit Chart Review     Updates Requested / Reviewed:     [x]  Care Everywhere    []     []  External Sources ([facility], [facility], DIS, etc.)   [x]  Care Team Updated    Patient Outreach Method:    [x]  Telephone Outreach Completed   [x] Successful   [] Left Voicemail   [] Unable to Contact (wrong number, no voicemail)  []  [email or web address] Portal Outreach Sent  []  Letter Outreach Mailed  []  Fax Sent for External Records  []  External Records Upload    Health Maintenance Topics Addressed and Outreach Outcomes / Actions Taken:        []      Breast Cancer Screening []  Mammo Scheduled      []  External Records Requested     []  Added Reminder to Complete to Upcoming Primary Care Appt Notes     []  Patient Declined     []  Patient Will Call Back to Schedule     []  Patient Will Schedule with External Provider / Order Routed if Applicable             []       Cervical Cancer Screening []  Pap Scheduled      []  External Records Requested     []  Added Reminder to Complete to Upcoming Primary Care Appt Notes     []  Patient Declined     []  Patient Will Call Back to Schedule     []  Patient Will Schedule with External Provider               []          Colorectal Cancer Screening []  Colonoscopy Case Request or Referral Placed     []  External Records Requested     []  Added Reminder to Complete to Upcoming Primary Care Appt Notes     []  Patient Declined     []  Patient Will Call Back to Schedule     []  Patient Will Schedule with External Provider     []  Fit Kit Mailed (add the SmartPhrase under additional notes)     []  Reminded Patient to Complete Home Test             []      Diabetic Eye Exam []  Eye Camera Scheduled or Optometry Referral Placed     []  External Records Requested     []  Added Reminder to Complete to  Upcoming Primary Care Appt Notes     []  Patient Declined     []  Patient Will Call Back to Schedule     []  Patient Will Schedule with External Provider             []      Blood Pressure Control []  Primary Care Follow Up Visit Scheduled     []  Remote Blood Pressure Reading Captured     []  Added Reminder to Complete to Upcoming Primary Care Appt Notes     []  Patient Declined     []  Patient Will Call Back / Patient Will Send Portal Message with Reading     []  Patient Will Call Back to Schedule Provider Visit             []       HbA1c & Other Labs []  Lab Appt Scheduled for Due Labs     []  Primary Care Follow Up Visit Scheduled      []  Reminded Patient to Complete Home Test     []  Added Reminder to Complete to Upcoming Primary Care Appt Notes     []  Patient Declined     []  Patient Will Call Back to Schedule     []  Patient Will Schedule with External Provider / Order Routed if Applicable           [x]    Schedule Primary Care Appt [x]  Primary Care Appt Scheduled     []  Patient Declined     []  Patient Will Call Back to Schedule     []  Pt Established with External Provider & Updated Care Team             []      Medication Adherence []  Primary Care Appointment Scheduled     []  Added Reminder to Upcoming Primary Care Appt Notes     []  Patient Reminded to  Prescription     []  Patient Declined, Provider Notified if Needed     []  Sent Provider Message to Review and/or Add Exclusion to Problem List             []      Osteoporosis Screening []  DXA Appointment Scheduled     []  External Records Requested     []  Added Reminder to Complete to Upcoming Primary Care Appt Notes     []  Patient Declined     []  Patient Will Call Back to Schedule     []  Patient Will Schedule with External Provider / Order Routed if Applicable     Additional Care Coordinator Notes:         Further Action Needed If Patient Returns Outreach:     Need to schedule visit with pcp.

## 2023-11-04 ENCOUNTER — HOSPITAL ENCOUNTER (EMERGENCY)
Facility: HOSPITAL | Age: 23
Discharge: HOME | End: 2023-11-04
Attending: EMERGENCY MEDICINE
Payer: MEDICAID

## 2023-11-04 VITALS
HEART RATE: 101 BPM | SYSTOLIC BLOOD PRESSURE: 141 MMHG | WEIGHT: 280 LBS | BODY MASS INDEX: 34.82 KG/M2 | HEIGHT: 75 IN | RESPIRATION RATE: 22 BRPM | OXYGEN SATURATION: 100 % | DIASTOLIC BLOOD PRESSURE: 70 MMHG | TEMPERATURE: 98 F

## 2023-11-04 DIAGNOSIS — J45.20 MILD INTERMITTENT ASTHMA WITHOUT COMPLICATION: ICD-10-CM

## 2023-11-04 DIAGNOSIS — R07.9 CHEST PAIN: ICD-10-CM

## 2023-11-04 DIAGNOSIS — J45.901 ASTHMA WITH ACUTE EXACERBATION, UNSPECIFIED ASTHMA SEVERITY, UNSPECIFIED WHETHER PERSISTENT: Primary | ICD-10-CM

## 2023-11-04 LAB
ALBUMIN SERPL BCP-MCNC: 3.4 G/DL (ref 3.5–5.2)
ALP SERPL-CCNC: 74 U/L (ref 55–135)
ALT SERPL W/O P-5'-P-CCNC: 46 U/L (ref 10–44)
ANION GAP SERPL CALC-SCNC: 10 MMOL/L (ref 8–16)
AST SERPL-CCNC: 27 U/L (ref 10–40)
BASOPHILS # BLD AUTO: 0.09 K/UL (ref 0–0.2)
BASOPHILS NFR BLD: 0.9 % (ref 0–1.9)
BILIRUB SERPL-MCNC: 1 MG/DL (ref 0.1–1)
BNP SERPL-MCNC: <10 PG/ML (ref 0–99)
BUN SERPL-MCNC: 8 MG/DL (ref 6–20)
CALCIUM SERPL-MCNC: 9.2 MG/DL (ref 8.7–10.5)
CHLORIDE SERPL-SCNC: 105 MMOL/L (ref 95–110)
CO2 SERPL-SCNC: 23 MMOL/L (ref 23–29)
CREAT SERPL-MCNC: 0.9 MG/DL (ref 0.5–1.4)
CTP QC/QA: YES
CTP QC/QA: YES
DIFFERENTIAL METHOD: ABNORMAL
EOSINOPHIL # BLD AUTO: 0.7 K/UL (ref 0–0.5)
EOSINOPHIL NFR BLD: 7.1 % (ref 0–8)
ERYTHROCYTE [DISTWIDTH] IN BLOOD BY AUTOMATED COUNT: 14.3 % (ref 11.5–14.5)
EST. GFR  (NO RACE VARIABLE): >60 ML/MIN/1.73 M^2
GLUCOSE SERPL-MCNC: 99 MG/DL (ref 70–110)
HCT VFR BLD AUTO: 43.6 % (ref 40–54)
HGB BLD-MCNC: 14 G/DL (ref 14–18)
IMM GRANULOCYTES # BLD AUTO: 0.02 K/UL (ref 0–0.04)
IMM GRANULOCYTES NFR BLD AUTO: 0.2 % (ref 0–0.5)
LYMPHOCYTES # BLD AUTO: 1.8 K/UL (ref 1–4.8)
LYMPHOCYTES NFR BLD: 18.7 % (ref 18–48)
MCH RBC QN AUTO: 27.8 PG (ref 27–31)
MCHC RBC AUTO-ENTMCNC: 32.1 G/DL (ref 32–36)
MCV RBC AUTO: 87 FL (ref 82–98)
MONOCYTES # BLD AUTO: 0.6 K/UL (ref 0.3–1)
MONOCYTES NFR BLD: 6.3 % (ref 4–15)
NEUTROPHILS # BLD AUTO: 6.5 K/UL (ref 1.8–7.7)
NEUTROPHILS NFR BLD: 66.8 % (ref 38–73)
NRBC BLD-RTO: 0 /100 WBC
PLATELET # BLD AUTO: ABNORMAL K/UL (ref 150–450)
PLATELET BLD QL SMEAR: ABNORMAL
PMV BLD AUTO: ABNORMAL FL (ref 9.2–12.9)
POC MOLECULAR INFLUENZA A AGN: NEGATIVE
POC MOLECULAR INFLUENZA B AGN: NEGATIVE
POTASSIUM SERPL-SCNC: 4.3 MMOL/L (ref 3.5–5.1)
PROT SERPL-MCNC: 7.8 G/DL (ref 6–8.4)
RBC # BLD AUTO: 5.04 M/UL (ref 4.6–6.2)
SARS-COV-2 RDRP RESP QL NAA+PROBE: NEGATIVE
SODIUM SERPL-SCNC: 138 MMOL/L (ref 136–145)
TROPONIN I SERPL DL<=0.01 NG/ML-MCNC: 0.01 NG/ML (ref 0–0.03)
WBC # BLD AUTO: 9.73 K/UL (ref 3.9–12.7)

## 2023-11-04 PROCEDURE — 93005 ELECTROCARDIOGRAM TRACING: CPT

## 2023-11-04 PROCEDURE — 63600175 PHARM REV CODE 636 W HCPCS: Performed by: EMERGENCY MEDICINE

## 2023-11-04 PROCEDURE — 94761 N-INVAS EAR/PLS OXIMETRY MLT: CPT

## 2023-11-04 PROCEDURE — 99291 CRITICAL CARE FIRST HOUR: CPT

## 2023-11-04 PROCEDURE — 87635 SARS-COV-2 COVID-19 AMP PRB: CPT | Performed by: EMERGENCY MEDICINE

## 2023-11-04 PROCEDURE — 93010 ELECTROCARDIOGRAM REPORT: CPT | Mod: ,,, | Performed by: INTERNAL MEDICINE

## 2023-11-04 PROCEDURE — 96372 THER/PROPH/DIAG INJ SC/IM: CPT | Performed by: EMERGENCY MEDICINE

## 2023-11-04 PROCEDURE — 84484 ASSAY OF TROPONIN QUANT: CPT | Performed by: EMERGENCY MEDICINE

## 2023-11-04 PROCEDURE — 94645 CONT INHLJ TX EACH ADDL HOUR: CPT

## 2023-11-04 PROCEDURE — 87502 INFLUENZA DNA AMP PROBE: CPT

## 2023-11-04 PROCEDURE — 83880 ASSAY OF NATRIURETIC PEPTIDE: CPT | Performed by: EMERGENCY MEDICINE

## 2023-11-04 PROCEDURE — 93010 EKG 12-LEAD: ICD-10-PCS | Mod: ,,, | Performed by: INTERNAL MEDICINE

## 2023-11-04 PROCEDURE — 85025 COMPLETE CBC W/AUTO DIFF WBC: CPT | Performed by: EMERGENCY MEDICINE

## 2023-11-04 PROCEDURE — 99285 EMERGENCY DEPT VISIT HI MDM: CPT | Mod: 25

## 2023-11-04 PROCEDURE — 25000242 PHARM REV CODE 250 ALT 637 W/ HCPCS: Performed by: EMERGENCY MEDICINE

## 2023-11-04 PROCEDURE — 80053 COMPREHEN METABOLIC PANEL: CPT | Performed by: EMERGENCY MEDICINE

## 2023-11-04 PROCEDURE — 94644 CONT INHLJ TX 1ST HOUR: CPT

## 2023-11-04 RX ORDER — MOMETASONE FUROATE AND FORMOTEROL FUMARATE DIHYDRATE 200; 5 UG/1; UG/1
2 AEROSOL RESPIRATORY (INHALATION) 2 TIMES DAILY
Qty: 13 G | Refills: 2 | Status: SHIPPED | OUTPATIENT
Start: 2023-11-04 | End: 2023-11-04 | Stop reason: SDUPTHER

## 2023-11-04 RX ORDER — PREDNISONE 20 MG/1
40 TABLET ORAL DAILY
Qty: 10 TABLET | Refills: 0 | Status: SHIPPED | OUTPATIENT
Start: 2023-11-05

## 2023-11-04 RX ORDER — TERBUTALINE SULFATE 1 MG/ML
0.25 INJECTION SUBCUTANEOUS ONCE
Status: COMPLETED | OUTPATIENT
Start: 2023-11-04 | End: 2023-11-04

## 2023-11-04 RX ORDER — ALBUTEROL SULFATE 2.5 MG/.5ML
10 SOLUTION RESPIRATORY (INHALATION)
Status: COMPLETED | OUTPATIENT
Start: 2023-11-04 | End: 2023-11-04

## 2023-11-04 RX ORDER — PREDNISONE 20 MG/1
40 TABLET ORAL DAILY
Qty: 10 TABLET | Refills: 0 | Status: SHIPPED | OUTPATIENT
Start: 2023-11-05 | End: 2023-11-04 | Stop reason: SDUPTHER

## 2023-11-04 RX ORDER — ALBUTEROL SULFATE 2.5 MG/.5ML
2.5 SOLUTION RESPIRATORY (INHALATION) EVERY 6 HOURS PRN
Qty: 25 EACH | Refills: 2 | Status: SHIPPED | OUTPATIENT
Start: 2023-11-04 | End: 2024-11-03

## 2023-11-04 RX ORDER — IPRATROPIUM BROMIDE 0.5 MG/2.5ML
500 SOLUTION RESPIRATORY (INHALATION)
Status: COMPLETED | OUTPATIENT
Start: 2023-11-04 | End: 2023-11-04

## 2023-11-04 RX ORDER — CETIRIZINE HYDROCHLORIDE 5 MG/1
10 TABLET ORAL NIGHTLY
Qty: 30 TABLET | Refills: 2 | Status: SHIPPED | OUTPATIENT
Start: 2023-11-04 | End: 2023-11-04 | Stop reason: SDUPTHER

## 2023-11-04 RX ORDER — ALBUTEROL SULFATE 2.5 MG/.5ML
2.5 SOLUTION RESPIRATORY (INHALATION) EVERY 6 HOURS PRN
Qty: 25 EACH | Refills: 2 | Status: SHIPPED | OUTPATIENT
Start: 2023-11-04 | End: 2023-11-04 | Stop reason: SDUPTHER

## 2023-11-04 RX ORDER — MOMETASONE FUROATE AND FORMOTEROL FUMARATE DIHYDRATE 200; 5 UG/1; UG/1
2 AEROSOL RESPIRATORY (INHALATION) 2 TIMES DAILY
Qty: 13 G | Refills: 2 | Status: SHIPPED | OUTPATIENT
Start: 2023-11-04 | End: 2024-01-05 | Stop reason: SDUPTHER

## 2023-11-04 RX ORDER — CETIRIZINE HYDROCHLORIDE 5 MG/1
10 TABLET ORAL NIGHTLY
Qty: 30 TABLET | Refills: 2 | Status: SHIPPED | OUTPATIENT
Start: 2023-11-04

## 2023-11-04 RX ORDER — ALBUTEROL SULFATE 90 UG/1
1-2 AEROSOL, METERED RESPIRATORY (INHALATION) EVERY 6 HOURS PRN
Qty: 8 G | Refills: 2 | Status: SHIPPED | OUTPATIENT
Start: 2023-11-04

## 2023-11-04 RX ORDER — PREDNISONE 20 MG/1
60 TABLET ORAL
Status: COMPLETED | OUTPATIENT
Start: 2023-11-04 | End: 2023-11-04

## 2023-11-04 RX ORDER — ALBUTEROL SULFATE 90 UG/1
1-2 AEROSOL, METERED RESPIRATORY (INHALATION) EVERY 6 HOURS PRN
Qty: 8 G | Refills: 2 | Status: SHIPPED | OUTPATIENT
Start: 2023-11-04 | End: 2023-11-04 | Stop reason: SDUPTHER

## 2023-11-04 RX ADMIN — ALBUTEROL SULFATE 10 MG: 2.5 SOLUTION RESPIRATORY (INHALATION) at 12:11

## 2023-11-04 RX ADMIN — IPRATROPIUM BROMIDE 500 MCG: 0.5 SOLUTION RESPIRATORY (INHALATION) at 10:11

## 2023-11-04 RX ADMIN — TERBUTALINE SULFATE 0.25 MG: 1 INJECTION SUBCUTANEOUS at 12:11

## 2023-11-04 RX ADMIN — ALBUTEROL SULFATE 10 MG: 2.5 SOLUTION RESPIRATORY (INHALATION) at 10:11

## 2023-11-04 RX ADMIN — PREDNISONE 60 MG: 20 TABLET ORAL at 10:11

## 2023-11-04 NOTE — DISCHARGE INSTRUCTIONS
Complete entire course of prednisone prescribed.  Use albuterol as needed for wheezing.  Use Dulera as you have previously been taking.  Be sure to schedule close follow-up with your primary physician to monitor and further evaluate your symptoms and for ongoing medication refills.  Return to the emergency department for any new, worsening or significantly concerning symptoms.    Thank you for coming to our Emergency Department today. It is important to remember that some problems are difficult to diagnose and may not be found during your first visit. Be sure to follow up with your primary care doctor and review any labs/imaging that was performed with them. If you do not have a primary care doctor, you may contact the one listed on your discharge paperwork or you may also call the [facility] Appointment Desk at [phone] to schedule an appointment with one.     All medications may potentially have side effects and it is impossible to predict which medications may give you side effects. If you feel that you are having a negative effect of any medication you should immediately stop taking them and seek medical attention.    Return to the ER with any questions/concerns, new/concerning symptoms, worsening or failure to improve. Do not drive or make any important decisions for 24 hours if you have received any pain medications, sedatives or mood altering drugs during your ER visit.

## 2023-11-04 NOTE — ED PROVIDER NOTES
Encounter Date: 11/4/2023    SCRIBE #1 NOTE: I, Cristina Fam, am scribing for, and in the presence of,  Angela Bauer MD. I have scribed the following portions of the note - Other sections scribed: HPI, ROS, PE, EKG, MDM.       History     Chief Complaint   Patient presents with    Chest Pain     Patient reports chest pain that us stabbing and steady for two days, with SOB states cant take a deep breath, denies recent travel Has had cough ad congestion been taking OTC meds without help.      This is a 23 y.o. male, with a PMHx of asthma, allergies, and eczema, who presents to the Emergency Department complaining of stabbing chest pain with associated SOB for 2-3 days. Patient reports the symptoms were initially intermittent but are currently constant. Patient reports using nebulizer treatments and OTC cough and congestion medications with temporary improvement lasting for 5 minutes at a time. Patient reports last asthma attack was 10 years ago and endorses previously being hospitalized asthma. Patient denies any history of recent travel or long flights. Denies fever, nausea, vomiting, leg swelling, or other associated symptoms.       The history is provided by the patient. No  was used.     Review of patient's allergies indicates:  No Known Allergies  Past Medical History:   Diagnosis Date    Allergy     Asthma     Eczema      No past surgical history on file.  No family history on file.  Social History     Tobacco Use    Smoking status: Never     Passive exposure: Yes    Smokeless tobacco: Never   Substance Use Topics    Alcohol use: No    Drug use: No     Review of Systems   Constitutional:  Negative for fever.   HENT:  Negative for facial swelling and sore throat.    Eyes:  Negative for pain and discharge.   Respiratory:  Positive for shortness of breath. Negative for choking.    Cardiovascular:  Positive for chest pain. Negative for leg swelling.   Gastrointestinal:  Negative for  abdominal pain, nausea and vomiting.   Genitourinary:  Negative for dysuria and frequency.   Musculoskeletal:  Negative for back pain.   Skin:  Negative for rash.   Neurological:  Negative for weakness and numbness.       Physical Exam     Initial Vitals [11/04/23 0954]   BP Pulse Resp Temp SpO2   138/69 110 20 98.1 °F (36.7 °C) 95 %      MAP       --         Physical Exam    Nursing note and vitals reviewed.  Constitutional: He is not diaphoretic. No distress.   HENT:   Head: Normocephalic and atraumatic.   Protecting airway   Eyes: Conjunctivae and EOM are normal. No scleral icterus.   Neck: Neck supple. No tracheal deviation present.   Normal range of motion.  Cardiovascular:  Normal rate, regular rhythm and intact distal pulses.           Pulmonary/Chest: Accessory muscle usage present. No stridor. No respiratory distress. He has wheezes.   Speaking in 4-5 word sentences.    Abdominal: Abdomen is soft. He exhibits no distension. There is no abdominal tenderness.   Musculoskeletal:         General: No tenderness or edema (Lower extremity).      Cervical back: Normal range of motion and neck supple.     Neurological: He is alert. He has normal strength. No cranial nerve deficit or sensory deficit.   Skin: Skin is warm and dry.   Scattered eczema rash.   Psychiatric: He has a normal mood and affect.         ED Course   Procedures  Labs Reviewed   CBC W/ AUTO DIFFERENTIAL - Abnormal; Notable for the following components:       Result Value    Eos # 0.7 (*)     Platelet Estimate Clumped (*)     All other components within normal limits   COMPREHENSIVE METABOLIC PANEL - Abnormal; Notable for the following components:    Albumin 3.4 (*)     ALT 46 (*)     All other components within normal limits   TROPONIN I   B-TYPE NATRIURETIC PEPTIDE   SARS-COV-2 RDRP GENE   POCT INFLUENZA A/B MOLECULAR     EKG Readings: (Independently Interpreted)   Initial Reading: No STEMI. Rhythm: Sinus Tachycardia. Heart Rate: 113. Ectopy: No  "Ectopy. Conduction: Normal. ST Segments: Normal ST Segments. T Waves: Normal. Axis: Normal.       Imaging Results              X-Ray Chest AP Portable (Final result)  Result time 11/04/23 10:41:39      Final result by Saumya Tenorio MD (11/04/23 10:41:39)                   Impression:      No acute abnormality.      Electronically signed by: Saumya Tenorio MD  Date:    11/04/2023  Time:    10:41               Narrative:    EXAMINATION:  XR CHEST AP PORTABLE    CLINICAL HISTORY:  Asthma;    TECHNIQUE:  Single frontal view of the chest was performed.    COMPARISON:  08/31/2015    FINDINGS:  The lungs are clear, with normal appearance of pulmonary vasculature and no pleural effusion or pneumothorax.  No pneumomediastinum seen.    The cardiac silhouette is normal in size. The hilar and mediastinal contours are unremarkable.    Bones are intact.                                       Medications   albuterol sulfate nebulizer solution 10 mg (10 mg Nebulization Given 11/4/23 1018)   ipratropium 0.02 % nebulizer solution 500 mcg (500 mcg Nebulization Given 11/4/23 1018)   predniSONE tablet 60 mg (60 mg Oral Given 11/4/23 1009)   terbutaline injection 0.25 mg (0.25 mg Subcutaneous Given 11/4/23 1243)   albuterol sulfate nebulizer solution 10 mg (10 mg Nebulization Given 11/4/23 1236)     Medical Decision Making  Differential diagnosis include but are not limited to: asthma exacerbation, upper respiratory infection, COVID, flu, pneumonia, ACS.       Amount and/or Complexity of Data Reviewed  External Data Reviewed: labs, radiology and notes.     Details: Patient visited his PCP on 9/29/22 for worsening SOB, "skin crawling", and eye itching. Patient was prescribed 4 mg medrol dosepack, proventil/ventolin, and albuterol nebulizer.    Patient visited the ED on 2/15/22 for SOB, cough, and wheezing secondary to asthma exacerbation. Patient was prescribed 200-4 mcg dulera, albuterol nebulizer, proventil/ventolin, and 20 " mg deltasone.     Patient visited the ED on 1/15/19 for OSB, congestion, and productive cough. Patient was prescribed 200-5 mcg memetasone/formoterol, albuterol nebulizer, and 50 mg prednisone.  Labs: ordered. Decision-making details documented in ED Course.  Radiology: ordered and independent interpretation performed. Decision-making details documented in ED Course.  ECG/medicine tests: ordered.    Risk  OTC drugs.  Prescription drug management.    Critical Care  Total time providing critical care: 30 minutes            Scribe Attestation:   Scribe #1: I performed the above scribed service and the documentation accurately describes the services I performed. I attest to the accuracy of the note.        ED Course as of 11/04/23 1740   Sat Nov 04, 2023   1202 Patient is afebrile and not toxic appearing at time history and physical.  He is wheezing on auscultation.  He is not in respiratory distress.  He is saturating adequately on room air. [SG]   1203 CBC auto differential(!)  No leukocytosis or granulocytosis to suggest infectious process [SG]   1203 Comprehensive metabolic panel(!)  No renal failure or electrolyte abnormality [SG]   1203 B-Type natriuretic peptide (BNP)  Unlikely to indicate CHF [SG]   1203 Troponin I #1  Low clinical suspicion of ACS [SG]   1203 X-Ray Chest AP Portable  No acute abnormality [SG]   1204 Patient given prednisone and continuous albuterol nebulizations in the emergency department.  On reassessment he is resting comfortably in stretcher.  He remains within adequate O2 saturation on room air but continues to wheeze significantly.  Will administer further nebulizations and terbutaline [SG]   1716 On reassessment patient is resting comfortably in stretcher.  He is saturating adequately on room air.  He does not have accessory muscle use or respiratory distress.  He has significant improvement in his wheezing.  He is clinically stable in the emergency department.  He is fit for discharge  to complete course of oral steroids.  Patient has been given refills of his asthma medications.  Referred to his primary physician for follow-up. counseled on supportive care, appropriate medication usage, concerning symptoms for which to return to ER and the importance of follow up. Understanding and agreement with treatment plan was expressed.  [SG]      ED Course User Index  [SG] Angela Bauer MD I, Selom Y Gasinu , personally performed the services described in this documentation. All medical record entries made by the scribe were at my direction and in my presence. I have reviewed the chart and agree that the record reflects my personal performance and is accurate and complete.    Clinical Impression:   Final diagnoses:  [R07.9] Chest pain  [J45.901] Asthma with acute exacerbation, unspecified asthma severity, unspecified whether persistent (Primary)        ED Disposition Condition    Discharge Stable          ED Prescriptions       Medication Sig Dispense Start Date End Date Auth. Provider    predniSONE (DELTASONE) 20 MG tablet Take 2 tablets (40 mg total) by mouth once daily. 10 tablet 11/5/2023 -- Angela Bauer MD    mometasone-formoterol (DULERA) 200-5 mcg/actuation inhaler Inhale 2 puffs into the lungs 2 (two) times daily. Controller 13 g 11/4/2023 11/3/2024 Angela Bauer MD    albuterol sulfate 2.5 mg/0.5 mL Nebu Take 2.5 mg by nebulization every 6 (six) hours as needed (Wheezing). Rescue 25 each 11/4/2023 11/3/2024 Angela Bauer MD    cetirizine (ZYRTEC) 5 MG tablet Take 2 tablets (10 mg total) by mouth every evening. 30 tablet 11/4/2023 -- Angela Bauer MD    albuterol (PROVENTIL/VENTOLIN HFA) 90 mcg/actuation inhaler Inhale 1-2 puffs into the lungs every 6 (six) hours as needed for Wheezing or Shortness of Breath. Rescue 8 g 11/4/2023 -- Angela Bauer MD          Follow-up Information       Follow up With Specialties Details Why Contact Info    Saul Washburn  MD [facility] Schedule an appointment as soon as possible for a visit   [address] [place] [address]  [phone]               Angela Bauer MD  11/04/23 8781

## 2023-11-04 NOTE — ED NOTES
Pt presents with sob and reproducible cp with movement x 3 days.  Reports hx of asthma and home inhalers have not helped with sob.  Denies n/v/d, fever, body aches, and chills    Patient identifiers verified by spelling and stated name on armband along with .     Review of patient's allergies indicates:  Review of patient's allergies indicates:  No Known Allergies    APPEARANCE: . No apparent distress or deformities noted.  NEURO: A&Ox4, GCS-15.   No neuro deficits noted.   CARDIAC: Sinus tach with reporducible cp upon movement  PERIPHERAL VASCULAR: Peripheral pulses present. Cap refill < 3 seconds x4. Warm to touch.    RESPIRATORY:Respirations are even and unlabored with tachypnea and wheezing noted, No use of accessory muscles, SOB reported x 3days.   GASTRO: Soft, non tender with no distention noted.  SKIN: Skin is warm and dry    Patient updated on plan of care and changed into hospital gown.  Placed on continuous CM/SpO2/NIBP.  Bed locked and in low position with side rails up x2, call light within reach.     Will continue to monitor.

## 2023-11-04 NOTE — ED NOTES
Ambulated without assistance, denies sob, cp, and dizziness.  SpO2 remained >97%, pt became tachycardic 115-120.  MD notified.  Will continue to monitor.

## 2024-01-05 DIAGNOSIS — J45.20 MILD INTERMITTENT ASTHMA WITHOUT COMPLICATION: ICD-10-CM

## 2024-01-05 RX ORDER — MOMETASONE FUROATE AND FORMOTEROL FUMARATE DIHYDRATE 200; 5 UG/1; UG/1
2 AEROSOL RESPIRATORY (INHALATION) 2 TIMES DAILY
Qty: 13 G | Refills: 2 | Status: SHIPPED | OUTPATIENT
Start: 2024-01-05 | End: 2025-01-04

## 2024-01-05 NOTE — TELEPHONE ENCOUNTER
No care due was identified.  Health [facility] Embedded Care Due Messages. Reference number: [id number].   1/05/2024 1:28:29 PM CST

## 2024-04-05 ENCOUNTER — LAB VISIT (OUTPATIENT)
Dept: LAB | Facility: HOSPITAL | Age: 24
End: 2024-04-05
Attending: INTERNAL MEDICINE
Payer: COMMERCIAL

## 2024-04-05 ENCOUNTER — OFFICE VISIT (OUTPATIENT)
Dept: FAMILY MEDICINE | Facility: CLINIC | Age: 24
End: 2024-04-05
Payer: COMMERCIAL

## 2024-04-05 VITALS
DIASTOLIC BLOOD PRESSURE: 82 MMHG | BODY MASS INDEX: 39.17 KG/M2 | HEIGHT: 75 IN | TEMPERATURE: 99 F | HEART RATE: 77 BPM | WEIGHT: 315 LBS | OXYGEN SATURATION: 99 % | SYSTOLIC BLOOD PRESSURE: 134 MMHG

## 2024-04-05 DIAGNOSIS — J45.20 MILD INTERMITTENT ASTHMA WITHOUT COMPLICATION: ICD-10-CM

## 2024-04-05 DIAGNOSIS — Z00.00 ANNUAL PHYSICAL EXAM: ICD-10-CM

## 2024-04-05 DIAGNOSIS — R60.0 BILATERAL LEG EDEMA: ICD-10-CM

## 2024-04-05 DIAGNOSIS — N64.3 GALACTORRHEA: ICD-10-CM

## 2024-04-05 DIAGNOSIS — Z00.00 ANNUAL PHYSICAL EXAM: Primary | ICD-10-CM

## 2024-04-05 DIAGNOSIS — I87.2 STASIS DERMATITIS OF BOTH LEGS: ICD-10-CM

## 2024-04-05 LAB
CHOLEST SERPL-MCNC: 194 MG/DL (ref 120–199)
CHOLEST/HDLC SERPL: 5.9 {RATIO} (ref 2–5)
ESTIMATED AVG GLUCOSE: 108 MG/DL (ref 68–131)
HBA1C MFR BLD: 5.4 % (ref 4–5.6)
HCV AB SERPL QL IA: NORMAL
HDLC SERPL-MCNC: 33 MG/DL (ref 40–75)
HDLC SERPL: 17 % (ref 20–50)
HIV 1+2 AB+HIV1 P24 AG SERPL QL IA: NORMAL
LDLC SERPL CALC-MCNC: 148.8 MG/DL (ref 63–159)
NONHDLC SERPL-MCNC: 161 MG/DL
PROLACTIN SERPL IA-MCNC: 9.4 NG/ML (ref 3.5–19.4)
TESTOST SERPL-MCNC: 283 NG/DL (ref 304–1227)
TRIGL SERPL-MCNC: 61 MG/DL (ref 30–150)
TSH SERPL DL<=0.005 MIU/L-ACNC: 0.83 UIU/ML (ref 0.4–4)

## 2024-04-05 PROCEDURE — 99214 OFFICE O/P EST MOD 30 MIN: CPT | Mod: S$GLB,,, | Performed by: INTERNAL MEDICINE

## 2024-04-05 PROCEDURE — 84443 ASSAY THYROID STIM HORMONE: CPT | Performed by: INTERNAL MEDICINE

## 2024-04-05 PROCEDURE — 83036 HEMOGLOBIN GLYCOSYLATED A1C: CPT | Performed by: INTERNAL MEDICINE

## 2024-04-05 PROCEDURE — 36415 COLL VENOUS BLD VENIPUNCTURE: CPT | Mod: PO | Performed by: INTERNAL MEDICINE

## 2024-04-05 PROCEDURE — 99999 PR PBB SHADOW E&M-EST. PATIENT-LVL III: CPT | Mod: PBBFAC,,, | Performed by: INTERNAL MEDICINE

## 2024-04-05 PROCEDURE — 84403 ASSAY OF TOTAL TESTOSTERONE: CPT | Performed by: INTERNAL MEDICINE

## 2024-04-05 PROCEDURE — 80061 LIPID PANEL: CPT | Performed by: INTERNAL MEDICINE

## 2024-04-05 PROCEDURE — 87389 HIV-1 AG W/HIV-1&-2 AB AG IA: CPT | Performed by: INTERNAL MEDICINE

## 2024-04-05 PROCEDURE — 84146 ASSAY OF PROLACTIN: CPT | Performed by: INTERNAL MEDICINE

## 2024-04-05 PROCEDURE — 86592 SYPHILIS TEST NON-TREP QUAL: CPT | Performed by: INTERNAL MEDICINE

## 2024-04-05 PROCEDURE — 86803 HEPATITIS C AB TEST: CPT | Performed by: INTERNAL MEDICINE

## 2024-04-05 RX ORDER — TRIAMCINOLONE ACETONIDE 1 MG/G
OINTMENT TOPICAL 2 TIMES DAILY
Qty: 30 G | Refills: 8 | Status: SHIPPED | OUTPATIENT
Start: 2024-04-05

## 2024-04-05 RX ORDER — MOMETASONE FUROATE AND FORMOTEROL FUMARATE DIHYDRATE 200; 5 UG/1; UG/1
2 AEROSOL RESPIRATORY (INHALATION) 2 TIMES DAILY
Qty: 13 G | Refills: 2 | Status: SHIPPED | OUTPATIENT
Start: 2024-04-05 | End: 2025-04-05

## 2024-04-05 RX ORDER — FUROSEMIDE 20 MG/1
20 TABLET ORAL DAILY
Qty: 7 TABLET | Refills: 0 | Status: SHIPPED | OUTPATIENT
Start: 2024-04-05 | End: 2024-04-12

## 2024-04-05 NOTE — ASSESSMENT & PLAN NOTE
Chronic, unresolved, bilateral LE edema (non pitting)    - advised to use compression stockings   - to keep legs elevated when sitting   - try to stay active and avoid prolonged sitting if possible  - start lasix 20mg daily x 7 d

## 2024-04-05 NOTE — PROGRESS NOTES
Health Maintenance Due   Topic     Hepatitis C Screening  Consult pcp    Lipid Panel  Consult pcp    HIV Screening  Consult pcp    TETANUS VACCINE  Pt decline    Influenza Vaccine (1) Pt decline    COVID-19 Vaccine (5 - 2023-24 season) Not offered at this office

## 2024-04-05 NOTE — ASSESSMENT & PLAN NOTE
"Acute, unresolved. Reports "yellowish discharge" from the nipple area. Unsure if 2/2 rash or hormonal issues     - prolactin   - rx eczema of nipple area  "

## 2024-04-05 NOTE — ASSESSMENT & PLAN NOTE
Discussed HCM with patient  - patient up to date with vaccines. Discussed the importance of vaccines.   - annual labs ordered  - advised patient to follow up with ophthalmologist and dentist every year  - reviewed medical, surgical, family and social history

## 2024-04-05 NOTE — PROGRESS NOTES
Chief Complaint: Annual Exam      Ermias Naidu  is a 23 y.o. year old patient who presents today for annual and rash    Dulera everyday, doing well   Rash on legs. Saw derm, started on steroid cream with minimal improvement. Never been on diuretic.     Past Medical History:   Diagnosis Date    Allergy     Asthma     Eczema        History reviewed. No pertinent surgical history.     History reviewed. No pertinent family history.     Social History     Socioeconomic History    Marital status: Single   Tobacco Use    Smoking status: Never     Passive exposure: Yes    Smokeless tobacco: Never   Substance and Sexual Activity    Alcohol use: No    Drug use: No    Sexual activity: Yes         Current Outpatient Medications:     albuterol sulfate 2.5 mg/0.5 mL Nebu, Take 2.5 mg by nebulization every 6 (six) hours as needed (Wheezing). Rescue, Disp: 25 each, Rfl: 2    cetirizine (ZYRTEC) 5 MG tablet, Take 2 tablets (10 mg total) by mouth every evening., Disp: 30 tablet, Rfl: 2    furosemide (LASIX) 20 MG tablet, Take 1 tablet (20 mg total) by mouth once daily. for 7 days, Disp: 7 tablet, Rfl: 0    mometasone-formoterol (DULERA) 200-5 mcg/actuation inhaler, Inhale 2 puffs into the lungs 2 (two) times daily. Controller, Disp: 13 g, Rfl: 2    triamcinolone acetonide 0.1% (KENALOG) 0.1 % ointment, Apply topically 2 (two) times daily., Disp: 30 g, Rfl: 8     Review of Systems   HENT:  Negative for congestion.    Eyes:  Negative for blurred vision.   Respiratory:  Negative for cough and shortness of breath.    Cardiovascular:  Negative for chest pain and leg swelling.   Gastrointestinal:  Negative for abdominal pain, blood in stool, constipation, diarrhea, melena and nausea.   Genitourinary:  Negative for dysuria.   Musculoskeletal:  Negative for joint pain.   Skin:  Positive for itching and rash.   Neurological:  Negative for headaches.   Psychiatric/Behavioral:  Negative for depression. The patient is not  nervous/anxious.         Objective:      Vitals:    04/05/24 0844   BP: 134/82   Pulse: 77   Temp: 99.1 °F (37.3 °C)       Physical Exam  Vitals and nursing note reviewed.   Constitutional:       Appearance: Normal appearance. He is obese.   HENT:      Head: Normocephalic and atraumatic.   Cardiovascular:      Rate and Rhythm: Normal rate and regular rhythm.      Comments: gynecomastia  Pulmonary:      Effort: Pulmonary effort is normal.      Breath sounds: Normal breath sounds. No wheezing or rales.   Abdominal:      General: Bowel sounds are normal.      Palpations: Abdomen is soft.      Tenderness: There is no abdominal tenderness.   Musculoskeletal:      Right lower leg: Edema present.      Left lower leg: Edema present.   Skin:     General: Skin is warm and dry.      Findings: Rash present.      Comments: Eczema around nipples. Stasis dermatitis BL   Neurological:      General: No focal deficit present.      Mental Status: He is alert and oriented to person, place, and time.   Psychiatric:         Mood and Affect: Mood normal.         Behavior: Behavior normal.          Assessment:       1. Annual physical exam    2. Stasis dermatitis of both legs    3. Bilateral leg edema    4. Mild intermittent asthma without complication    5. Galactorrhea          Plan:   1. Annual physical exam  Assessment & Plan:  Discussed HCM with patient  - patient up to date with vaccines. Discussed the importance of vaccines.   - annual labs ordered  - advised patient to follow up with ophthalmologist and dentist every year  - reviewed medical, surgical, family and social history      Orders:  -     Hemoglobin A1C; Future; Expected date: 04/05/2024  -     Lipid Panel; Future; Expected date: 04/05/2024  -     TSH; Future; Expected date: 04/05/2024  -     RPR; Future; Expected date: 04/05/2024  -     HIV 1/2 Ag/Ab (4th Gen); Future; Expected date: 04/05/2024  -     Hepatitis C Antibody; Future; Expected date: 04/05/2024    2. Stasis  "dermatitis of both legs  Assessment & Plan:  Chronic, unresolved. Likely 2/2 BL edema     - rx edema   - cont kenalog PRN    Orders:  -     triamcinolone acetonide 0.1% (KENALOG) 0.1 % ointment; Apply topically 2 (two) times daily.  Dispense: 30 g; Refill: 8    3. Bilateral leg edema  Assessment & Plan:  Chronic, unresolved, bilateral LE edema (non pitting)    - advised to use compression stockings   - to keep legs elevated when sitting   - try to stay active and avoid prolonged sitting if possible  - start lasix 20mg daily x 7 d        Orders:  -     furosemide (LASIX) 20 MG tablet; Take 1 tablet (20 mg total) by mouth once daily. for 7 days  Dispense: 7 tablet; Refill: 0    4. Mild intermittent asthma without complication  Assessment & Plan:  Chronic, controlled     - continue Dulera daily and PRN       Orders:  -     mometasone-formoterol (DULERA) 200-5 mcg/actuation inhaler; Inhale 2 puffs into the lungs 2 (two) times daily. Controller  Dispense: 13 g; Refill: 2    5. Galactorrhea  Assessment & Plan:  Acute, unresolved. Reports "yellowish discharge" from the nipple area. Unsure if 2/2 rash or hormonal issues     - prolactin   - rx eczema of nipple area    Orders:  -     PROLACTIN; Future; Expected date: 04/05/2024  -     TESTOSTERONE; Future; Expected date: 04/05/2024         Follow up in about 3 weeks (around 4/26/2024) for f/up edema.         "

## 2024-04-08 LAB — RPR SER QL: NORMAL

## 2024-07-08 PROBLEM — Z00.00 ANNUAL PHYSICAL EXAM: Status: RESOLVED | Noted: 2024-04-05 | Resolved: 2024-07-08

## 2025-04-25 ENCOUNTER — HOSPITAL ENCOUNTER (EMERGENCY)
Facility: HOSPITAL | Age: 25
Discharge: HOME | End: 2025-04-25
Attending: EMERGENCY MEDICINE
Payer: COMMERCIAL

## 2025-04-25 VITALS
RESPIRATION RATE: 20 BRPM | WEIGHT: 300 LBS | OXYGEN SATURATION: 98 % | DIASTOLIC BLOOD PRESSURE: 73 MMHG | HEART RATE: 88 BPM | TEMPERATURE: 98 F | BODY MASS INDEX: 37.3 KG/M2 | SYSTOLIC BLOOD PRESSURE: 140 MMHG | HEIGHT: 75 IN

## 2025-04-25 DIAGNOSIS — J45.901 EXACERBATION OF ASTHMA, UNSPECIFIED ASTHMA SEVERITY, UNSPECIFIED WHETHER PERSISTENT: ICD-10-CM

## 2025-04-25 DIAGNOSIS — J45.20 MILD INTERMITTENT ASTHMA WITHOUT COMPLICATION: ICD-10-CM

## 2025-04-25 DIAGNOSIS — R06.02 SHORTNESS OF BREATH: Primary | ICD-10-CM

## 2025-04-25 PROCEDURE — 25000242 PHARM REV CODE 250 ALT 637 W/ HCPCS

## 2025-04-25 PROCEDURE — 93005 ELECTROCARDIOGRAM TRACING: CPT

## 2025-04-25 PROCEDURE — 94644 CONT INHLJ TX 1ST HOUR: CPT

## 2025-04-25 PROCEDURE — 99285 EMERGENCY DEPT VISIT HI MDM: CPT | Mod: 25

## 2025-04-25 PROCEDURE — 93010 ELECTROCARDIOGRAM REPORT: CPT | Mod: ,,, | Performed by: INTERNAL MEDICINE

## 2025-04-25 RX ORDER — IPRATROPIUM BROMIDE AND ALBUTEROL SULFATE 2.5; .5 MG/3ML; MG/3ML
3 SOLUTION RESPIRATORY (INHALATION)
Status: COMPLETED | OUTPATIENT
Start: 2025-04-25 | End: 2025-04-25

## 2025-04-25 RX ORDER — MOMETASONE FUROATE AND FORMOTEROL FUMARATE DIHYDRATE 200; 5 UG/1; UG/1
2 AEROSOL RESPIRATORY (INHALATION) 2 TIMES DAILY
Qty: 13 G | Refills: 2 | Status: SHIPPED | OUTPATIENT
Start: 2025-04-25 | End: 2025-05-25

## 2025-04-25 RX ORDER — ALBUTEROL SULFATE 2.5 MG/.5ML
2.5 SOLUTION RESPIRATORY (INHALATION) EVERY 6 HOURS PRN
Qty: 25 EACH | Refills: 2 | Status: SHIPPED | OUTPATIENT
Start: 2025-04-25 | End: 2025-05-25

## 2025-04-25 RX ORDER — ALBUTEROL SULFATE 90 UG/1
2 INHALANT RESPIRATORY (INHALATION) EVERY 6 HOURS PRN
Qty: 18 G | Refills: 0 | Status: SHIPPED | OUTPATIENT
Start: 2025-04-25 | End: 2025-05-25

## 2025-04-25 RX ORDER — LORATADINE 10 MG/1
10 TABLET ORAL DAILY
Qty: 30 TABLET | Refills: 0 | Status: SHIPPED | OUTPATIENT
Start: 2025-04-25 | End: 2025-05-25

## 2025-04-25 RX ADMIN — IPRATROPIUM BROMIDE AND ALBUTEROL SULFATE 3 ML: 2.5; .5 SOLUTION RESPIRATORY (INHALATION) at 02:04

## 2025-04-25 NOTE — Clinical Note
"Ermias Brancarlos" Elysia was seen and treated in our emergency department on 4/25/2025.  He may return to work on 04/28/2025.       If you have any questions or concerns, please don't hesitate to call.      Carmen Mistry PA-C"

## 2025-04-25 NOTE — ED PROVIDER NOTES
Encounter Date: 4/25/2025       History     Chief Complaint   Patient presents with    Shortness of Breath     Pt arrived in ED, c/o SOB for a few days. Pt reports hx of asthma and being out of his meds for approximately one week. Pt appears in no distress in triage. Pt denies any CP, abd pain or n/v/d     Patient is a 24-year-old male with a past medical history of asthma eczema who presents to the emergency department with complaints of shortness of breath times 2 weeks.  Patient states that he recently ran out of his inhaler, Dulera, as well as the albuterol for his nebulizer treatment.  He denies any recent fevers, chills, chest pain he denies ever being admitted for asthma exacerbation.  He does not have his PCP for which he follows up with.        Review of patient's allergies indicates:  No Known Allergies  Past Medical History:   Diagnosis Date    Allergy     Asthma     Eczema      History reviewed. No pertinent surgical history.  No family history on file.  Social History[1]  Review of Systems   Constitutional:  Negative for chills and fever.   Respiratory:  Positive for shortness of breath. Negative for chest tightness.    Cardiovascular:  Negative for chest pain and leg swelling.   Gastrointestinal:  Negative for abdominal pain and nausea.   Neurological:  Negative for dizziness and weakness.       Physical Exam     Initial Vitals [04/25/25 1330]   BP Pulse Resp Temp SpO2   (!) 140/73 88 20 98.2 °F (36.8 °C) 98 %      MAP       --         Physical Exam    Nursing note and vitals reviewed.  Constitutional: He appears well-developed and well-nourished. He is not diaphoretic. He does not appear ill. No distress.   HENT:   Head: Normocephalic and atraumatic.   Right Ear: External ear normal.   Left Ear: External ear normal.   Nose: Nose normal. Mouth/Throat: Uvula is midline and oropharynx is clear and moist.   Eyes: Conjunctivae and EOM are normal. Pupils are equal, round, and reactive to light. Right eye  exhibits no discharge. Left eye exhibits no discharge. No scleral icterus.   Neck: Trachea normal.   Normal range of motion.   Full passive range of motion without pain.     Cardiovascular:  Normal rate, regular rhythm and normal pulses.     Exam reveals no distant heart sounds.       No murmur heard.  Pulmonary/Chest: Effort normal. No respiratory distress. He has no wheezes. He has no rhonchi. He has no rales.   Abdominal: Abdomen is soft. Bowel sounds are normal. He exhibits no distension and no pulsatile midline mass. There is no abdominal tenderness.   No right CVA tenderness.  No left CVA tenderness. There is no rebound and no guarding.   Musculoskeletal:         General: Normal range of motion.      Cervical back: Full passive range of motion without pain and normal range of motion.     Neurological: He is alert and oriented to person, place, and time. He has normal strength. No cranial nerve deficit or sensory deficit. Coordination and gait normal.   Skin: Skin is warm and dry. Capillary refill takes less than 2 seconds. No bruising, no ecchymosis and no rash noted. No erythema.   Psychiatric: He has a normal mood and affect. His speech is normal and behavior is normal. Thought content normal.         ED Course   Procedures  Labs Reviewed - No data to display       Imaging Results    None          Medications   albuterol-ipratropium 2.5 mg-0.5 mg/3 mL nebulizer solution 3 mL (3 mLs Nebulization Given 4/25/25 6725)     Medical Decision Making  Patient is a 24-year-old male with a past medical history of asthma eczema who presents to the emergency department with complaints of shortness of breath times 2 weeks.  Patient states that he recently ran out of his inhaler, Dulera, as well as the albuterol for his nebulizer treatment.  He denies any recent fevers, chills, chest pain he denies ever being admitted for asthma exacerbation.  He does not have his PCP for which he follows up with.    Also considered but  less likely:   Pneumonia: no fever, unilateral ronchi, or signs concerning of pneumonia  COPD: no history of COPD  ACS: presentation atypical and more consistent with asthma  Pneumothorax: bilateral breath sounds and wheezing bilaterally  CHF: no signs of fluid overload and no history    Return precautions given, patient understands and agrees with plan. All questions answered.  Instructed to follow up with PCP. I discussed with the patient the diagnosis, treatment plan, indications for return to the emergency department, and for expected follow-up. The patient verbalized an understanding. The patient is asked if there are any questions or concerns. We discuss the case, until all issues are addressed to the patient's satisfaction. Patient understands and is agreeable to the plan.       Risk  OTC drugs.  Prescription drug management.                                      Clinical Impression:  Final diagnoses:  [R06.02] Shortness of breath (Primary)  [J45.901] Exacerbation of asthma, unspecified asthma severity, unspecified whether persistent          ED Disposition Condition    Discharge Stable          ED Prescriptions       Medication Sig Dispense Start Date End Date Auth. Provider    mometasone-formoterol (DULERA) 200-5 mcg/actuation inhaler Inhale 2 puffs into the lungs 2 (two) times daily. Controller 13 g 4/25/2025 5/25/2025 Carmen Mistry PA-C    albuterol sulfate 2.5 mg/0.5 mL Nebu Take 2.5 mg by nebulization every 6 (six) hours as needed (Wheezing). Rescue 25 each 4/25/2025 5/25/2025 Carmen Mistry PA-C    albuterol (VENTOLIN HFA) 90 mcg/actuation inhaler Inhale 2 puffs into the lungs every 6 (six) hours as needed for Wheezing. Rescue 18 g 4/25/2025 5/25/2025 Carmen Mistry PA-C    loratadine (CLARITIN) 10 mg tablet Take 1 tablet (10 mg total) by mouth once daily. 30 tablet 4/25/2025 5/25/2025 Carmen Mistry PA-C          Follow-up Information       Follow up With Specialties Details Why Contact Magdiel Vásquez  MD [facility]   [address] [place] [address]  [phone]      [facility] Emergency Medicine Go to  for new or worsening symptoms [address] [facility] [place] [id number] [phone]               [1]   Social History  Tobacco Use    Smoking status: Never     Passive exposure: Yes    Smokeless tobacco: Never   Substance Use Topics    Alcohol use: No    Drug use: No        Carmen Mistry PA-C  04/25/25 1715

## 2025-04-25 NOTE — DISCHARGE INSTRUCTIONS
Thank you for coming to our Emergency Department today. It is important to remember that some problems or medical conditions are difficult to diagnose and may not be found or addressed during your Emergency Department visit.  These conditions often start with non-specific symptoms and can only be diagnosed on follow up visits with your primary care physician or specialist when the symptoms continue or change. Please remember that all medical conditions can change, and we cannot predict how you will be feeling tomorrow or the next day. Return to the ER with any questions/concerns, new/concerning symptoms, worsening or failure to improve.       Be sure to follow up with your primary care doctor and review all labs/imaging/tests that were performed during your ER visit with them. It is very common for us to identify non-emergent incidental findings which must be followed up with your primary care physician.  Some labs/imaging/tests may be outside of the normal range, and require non-emergent follow-up and/or further investigation/treatment/procedures/testing to help diagnose/exclude/prevent complications or other potentially serious medical conditions. Some abnormalities may not have been discussed or addressed during your ER visit.     An ER visit does not replace a primary care visit, and many screening tests or follow-up tests cannot be ordered by an ER doctor or performed by the ER. Some tests may even require pre-approval.    If you do not have a primary care doctor, you may contact the one listed on your discharge paperwork or you may also call the [facility] Appointment Desk at [phone] , or [address] [place] [address] at  [phone] to schedule an appointment, or establish care with a primary care doctor or even a specialist and to obtain information about local resources. It is important to your health that you have a primary care doctor.    Please take all medications as directed. We have done our best to select  a medication for you that will treat your condition however, all medications may potentially have side-effects and it is impossible to predict which medications may give you side-effects or what those side-effects (if any) those medications may give you.  If you feel that you are having a negative effect or side-effect of any medication you should stop taking those medications immediately and seek medical attention. If you feel that you are having a life-threatening reaction call 911.        Do not drive, swim, climb to height, take a bath, operate heavy machinery, drink alcohol or take potentially sedating medications, sign any legal documents or make any important decisions for 24 hours if you have received any pain medications, sedatives or mood altering drugs during your ER visit or within 24 hours of taking them if they have been prescribed to you.     You can find additional resources for Dentists, hearing aids, durable medical equipment, low cost pharmacies and other resources at [email or web address]

## 2025-04-26 LAB
OHS QRS DURATION: 90 MS
OHS QTC CALCULATION: 397 MS

## 2025-07-07 ENCOUNTER — LAB VISIT (OUTPATIENT)
Dept: LAB | Facility: HOSPITAL | Age: 25
End: 2025-07-07
Attending: FAMILY MEDICINE
Payer: COMMERCIAL

## 2025-07-07 ENCOUNTER — OFFICE VISIT (OUTPATIENT)
Dept: FAMILY MEDICINE | Facility: CLINIC | Age: 25
End: 2025-07-07
Payer: COMMERCIAL

## 2025-07-07 VITALS
HEIGHT: 75 IN | DIASTOLIC BLOOD PRESSURE: 86 MMHG | OXYGEN SATURATION: 97 % | TEMPERATURE: 98 F | HEART RATE: 74 BPM | SYSTOLIC BLOOD PRESSURE: 138 MMHG | WEIGHT: 315 LBS | BODY MASS INDEX: 39.17 KG/M2

## 2025-07-07 DIAGNOSIS — J45.901 EXACERBATION OF ASTHMA, UNSPECIFIED ASTHMA SEVERITY, UNSPECIFIED WHETHER PERSISTENT: ICD-10-CM

## 2025-07-07 DIAGNOSIS — Z00.00 ANNUAL PHYSICAL EXAM: ICD-10-CM

## 2025-07-07 DIAGNOSIS — Z51.81 MEDICATION MONITORING ENCOUNTER: ICD-10-CM

## 2025-07-07 DIAGNOSIS — L30.9 ECZEMA, UNSPECIFIED TYPE: Primary | ICD-10-CM

## 2025-07-07 DIAGNOSIS — I87.2 STASIS DERMATITIS OF BOTH LEGS: ICD-10-CM

## 2025-07-07 DIAGNOSIS — J45.20 MILD INTERMITTENT ASTHMA WITHOUT COMPLICATION: ICD-10-CM

## 2025-07-07 LAB
ABSOLUTE EOSINOPHIL (OHS): 0.56 K/UL
ABSOLUTE MONOCYTE (OHS): 0.62 K/UL (ref 0.3–1)
ABSOLUTE NEUTROPHIL COUNT (OHS): 5.29 K/UL (ref 1.8–7.7)
ALBUMIN SERPL BCP-MCNC: 3.4 G/DL (ref 3.5–5.2)
ALP SERPL-CCNC: 68 UNIT/L (ref 40–150)
ALT SERPL W/O P-5'-P-CCNC: 44 UNIT/L (ref 10–44)
ANION GAP (OHS): 10 MMOL/L (ref 8–16)
AST SERPL-CCNC: 24 UNIT/L (ref 11–45)
BASOPHILS # BLD AUTO: 0.09 K/UL
BASOPHILS NFR BLD AUTO: 1 %
BILIRUB SERPL-MCNC: 0.5 MG/DL (ref 0.1–1)
BUN SERPL-MCNC: 8 MG/DL (ref 6–20)
CALCIUM SERPL-MCNC: 9.2 MG/DL (ref 8.7–10.5)
CHLORIDE SERPL-SCNC: 105 MMOL/L (ref 95–110)
CHOLEST SERPL-MCNC: 200 MG/DL (ref 120–199)
CHOLEST/HDLC SERPL: 5 {RATIO} (ref 2–5)
CO2 SERPL-SCNC: 26 MMOL/L (ref 23–29)
CREAT SERPL-MCNC: 1 MG/DL (ref 0.5–1.4)
EAG (OHS): 105 MG/DL (ref 68–131)
ERYTHROCYTE [DISTWIDTH] IN BLOOD BY AUTOMATED COUNT: 13.6 % (ref 11.5–14.5)
GFR SERPLBLD CREATININE-BSD FMLA CKD-EPI: >60 ML/MIN/1.73/M2
GLUCOSE SERPL-MCNC: 97 MG/DL (ref 70–110)
HAV IGM SERPL QL IA: NORMAL
HBA1C MFR BLD: 5.3 % (ref 4–5.6)
HBV CORE IGM SERPL QL IA: NORMAL
HBV SURFACE AG SERPL QL IA: NORMAL
HCT VFR BLD AUTO: 44.5 % (ref 40–54)
HCV AB SERPL QL IA: NORMAL
HDLC SERPL-MCNC: 40 MG/DL (ref 40–75)
HDLC SERPL: 20 % (ref 20–50)
HGB BLD-MCNC: 14.6 GM/DL (ref 14–18)
HIV 1+2 AB+HIV1 P24 AG SERPL QL IA: NORMAL
IMM GRANULOCYTES # BLD AUTO: 0.03 K/UL (ref 0–0.04)
IMM GRANULOCYTES NFR BLD AUTO: 0.3 % (ref 0–0.5)
LDLC SERPL CALC-MCNC: 146 MG/DL (ref 63–159)
LYMPHOCYTES # BLD AUTO: 2.38 K/UL (ref 1–4.8)
MCH RBC QN AUTO: 28.1 PG (ref 27–31)
MCHC RBC AUTO-ENTMCNC: 32.8 G/DL (ref 32–36)
MCV RBC AUTO: 86 FL (ref 82–98)
NONHDLC SERPL-MCNC: 160 MG/DL
NUCLEATED RBC (/100WBC) (OHS): 0 /100 WBC
PLATELET # BLD AUTO: 348 K/UL (ref 150–450)
PMV BLD AUTO: 10.2 FL (ref 9.2–12.9)
POTASSIUM SERPL-SCNC: 4.2 MMOL/L (ref 3.5–5.1)
PROT SERPL-MCNC: 8 GM/DL (ref 6–8.4)
RBC # BLD AUTO: 5.19 M/UL (ref 4.6–6.2)
RELATIVE EOSINOPHIL (OHS): 6.2 %
RELATIVE LYMPHOCYTE (OHS): 26.5 % (ref 18–48)
RELATIVE MONOCYTE (OHS): 6.9 % (ref 4–15)
RELATIVE NEUTROPHIL (OHS): 59.1 % (ref 38–73)
SODIUM SERPL-SCNC: 141 MMOL/L (ref 136–145)
TRIGL SERPL-MCNC: 70 MG/DL (ref 30–150)
WBC # BLD AUTO: 8.97 K/UL (ref 3.9–12.7)

## 2025-07-07 PROCEDURE — 80074 ACUTE HEPATITIS PANEL: CPT

## 2025-07-07 PROCEDURE — 83036 HEMOGLOBIN GLYCOSYLATED A1C: CPT

## 2025-07-07 PROCEDURE — 87389 HIV-1 AG W/HIV-1&-2 AB AG IA: CPT

## 2025-07-07 PROCEDURE — 85025 COMPLETE CBC W/AUTO DIFF WBC: CPT

## 2025-07-07 PROCEDURE — 86480 TB TEST CELL IMMUN MEASURE: CPT

## 2025-07-07 PROCEDURE — 99999 PR PBB SHADOW E&M-EST. PATIENT-LVL IV: CPT | Mod: PBBFAC,,, | Performed by: FAMILY MEDICINE

## 2025-07-07 PROCEDURE — 36415 COLL VENOUS BLD VENIPUNCTURE: CPT | Mod: PO

## 2025-07-07 PROCEDURE — 96372 THER/PROPH/DIAG INJ SC/IM: CPT | Mod: S$GLB,,, | Performed by: FAMILY MEDICINE

## 2025-07-07 PROCEDURE — 83718 ASSAY OF LIPOPROTEIN: CPT

## 2025-07-07 PROCEDURE — 99214 OFFICE O/P EST MOD 30 MIN: CPT | Mod: 25,S$GLB,, | Performed by: FAMILY MEDICINE

## 2025-07-07 PROCEDURE — 84460 ALANINE AMINO (ALT) (SGPT): CPT

## 2025-07-07 RX ORDER — DOXYCYCLINE HYCLATE 100 MG
100 TABLET ORAL DAILY
Qty: 30 TABLET | Refills: 0 | Status: SHIPPED | OUTPATIENT
Start: 2025-07-07 | End: 2025-08-06

## 2025-07-07 RX ORDER — TRIAMCINOLONE ACETONIDE 1 MG/G
OINTMENT TOPICAL 2 TIMES DAILY
Qty: 454 G | Refills: 8 | Status: SHIPPED | OUTPATIENT
Start: 2025-07-07

## 2025-07-07 RX ORDER — PREDNISONE 10 MG/1
TABLET ORAL
Qty: 40 TABLET | Refills: 0 | Status: SHIPPED | OUTPATIENT
Start: 2025-07-07

## 2025-07-07 RX ORDER — MOMETASONE FUROATE AND FORMOTEROL FUMARATE DIHYDRATE 200; 5 UG/1; UG/1
2 AEROSOL RESPIRATORY (INHALATION) 2 TIMES DAILY
Qty: 13 G | Refills: 2 | Status: SHIPPED | OUTPATIENT
Start: 2025-07-07 | End: 2025-08-06

## 2025-07-07 RX ORDER — LORATADINE 10 MG/1
10 TABLET ORAL DAILY
Qty: 90 TABLET | Refills: 1 | Status: SHIPPED | OUTPATIENT
Start: 2025-07-07 | End: 2026-07-07

## 2025-07-07 RX ORDER — METHYLPREDNISOLONE ACETATE 40 MG/ML
40 INJECTION, SUSPENSION INTRA-ARTICULAR; INTRALESIONAL; INTRAMUSCULAR; SOFT TISSUE
Status: COMPLETED | OUTPATIENT
Start: 2025-07-07 | End: 2025-07-07

## 2025-07-07 RX ADMIN — METHYLPREDNISOLONE ACETATE 40 MG: 40 INJECTION, SUSPENSION INTRA-ARTICULAR; INTRALESIONAL; INTRAMUSCULAR; SOFT TISSUE at 10:07

## 2025-07-07 NOTE — PROGRESS NOTES
"Subjective     Patient ID: Ermias Naidu is a 25 y.o. male.    Chief Complaint: Asthma and Establish Care    25 year old male presents for follow up. She states he couldn't get his dulera or claritin. He has used albuterol rescue inhaler and claritin. He has had prednisone for flares. He has used singulair in the past.     He also has been having an eczema flare up. He has been getting over the counter creams due to cost. He has it on his arms, legs, chest.       History of Present Illness               Past Medical History:   Diagnosis Date    Allergy     Alopecia (capitis) totalis     Asthma     Eczema       History reviewed. No pertinent surgical history.  Family History   Problem Relation Name Age of Onset    Glaucoma Mother      Valvular heart disease Father      Anxiety disorder Sister      Heart attacks under age 50 Brother          35-40. paternal brother    Heart disease Brother      Heart disease Brother      Diabetes type II Paternal Grandmother      No Known Problems Paternal Grandfather       Social History[1]    Review of Systems         Objective     Vitals:    07/07/25 0906   BP: 138/86   Pulse: 74   Temp: 98 °F (36.7 °C)   TempSrc: Oral   SpO2: 97%   Weight: (!) 176 kg (388 lb 0.2 oz)   Height: 6' 3" (1.905 m)        Physical Exam  Constitutional:       General: He is not in acute distress.     Appearance: Normal appearance. He is well-developed. He is not ill-appearing, toxic-appearing or diaphoretic.   HENT:      Head: Normocephalic and atraumatic.      Right Ear: External ear normal.      Left Ear: External ear normal.      Mouth/Throat:      Pharynx: No oropharyngeal exudate.   Cardiovascular:      Rate and Rhythm: Normal rate and regular rhythm.      Heart sounds: Normal heart sounds. No murmur heard.     No friction rub. No gallop.   Pulmonary:      Effort: Pulmonary effort is normal. No respiratory distress.      Breath sounds: No stridor. Wheezing present. No rhonchi or " rales.   Chest:      Chest wall: No tenderness.   Musculoskeletal:      Cervical back: Normal range of motion and neck supple.   Lymphadenopathy:      Cervical: No cervical adenopathy.   Skin:     Findings: Rash present.      Comments: Diffuse eczema on both arms, legs, neck   Neurological:      Mental Status: He is alert and oriented to person, place, and time.   Physical Exam                Assessment and Plan     1. Eczema, unspecified type  -     Ambulatory referral/consult to Dermatology; Future; Expected date: 07/14/2025  -     loratadine (CLARITIN) 10 mg tablet; Take 1 tablet (10 mg total) by mouth once daily.  Dispense: 90 tablet; Refill: 1  -     predniSONE (DELTASONE) 10 MG tablet; Take 4 pills po x 4 days, then 3 pills po x 4 days, then 2 pills po x 4 days, then 1 pill po x 4 days, then stop.  Dispense: 40 tablet; Refill: 0  -     doxycycline (VIBRA-TABS) 100 MG tablet; Take 1 tablet (100 mg total) by mouth once daily. for 7 days  Dispense: 30 tablet; Refill: 0  Will do prednisone taper and doxycycline . Will give topical steroids as well once improved. Use scent free lotion and cream.   Great candidate for dupixent. Will consult with dermatology to see if they agree.     2. Exacerbation of asthma, unspecified asthma severity, unspecified whether persistent  -     Ambulatory referral/consult to Family Practice  -     loratadine (CLARITIN) 10 mg tablet; Take 1 tablet (10 mg total) by mouth once daily.  Dispense: 90 tablet; Refill: 1    3. Medication monitoring encounter  -     Hepatitis Panel, Acute; Future; Expected date: 07/07/2025  -     HIV 1/2 Ag/Ab (4th Gen); Future; Expected date: 07/07/2025  -     Quantiferon Gold TB; Future; Expected date: 07/07/2025    4. Mild intermittent asthma without complication  -     mometasone-formoterol (DULERA) 200-5 mcg/actuation inhaler; Inhale 2 puffs into the lungs 2 (two) times daily. Controller  Dispense: 13 g; Refill: 2  -     loratadine (CLARITIN) 10 mg tablet;  Take 1 tablet (10 mg total) by mouth once daily.  Dispense: 90 tablet; Refill: 1  -     predniSONE (DELTASONE) 10 MG tablet; Take 4 pills po x 4 days, then 3 pills po x 4 days, then 2 pills po x 4 days, then 1 pill po x 4 days, then stop.  Dispense: 40 tablet; Refill: 0    5. Annual physical exam  -     CBC Auto Differential; Future; Expected date: 07/07/2025  -     Comprehensive Metabolic Panel; Future; Expected date: 07/07/2025  -     Hemoglobin A1C; Future; Expected date: 07/07/2025  -     Lipid Panel; Future; Expected date: 01/07/2026    6. Stasis dermatitis of both legs  -     triamcinolone acetonide 0.1% (KENALOG) 0.1 % ointment; Apply topically 2 (two) times daily.  Dispense: 453 g; Refill: 8      Elisah was seen today for asthma and establish care.    Diagnoses and all orders for this visit:    Eczema, unspecified type  -     Ambulatory referral/consult to Dermatology; Future  -     loratadine (CLARITIN) 10 mg tablet; Take 1 tablet (10 mg total) by mouth once daily.  -     predniSONE (DELTASONE) 10 MG tablet; Take 4 pills po x 4 days, then 3 pills po x 4 days, then 2 pills po x 4 days, then 1 pill po x 4 days, then stop.  -     doxycycline (VIBRA-TABS) 100 MG tablet; Take 1 tablet (100 mg total) by mouth once daily. for 7 days    Exacerbation of asthma, unspecified asthma severity, unspecified whether persistent  -     Ambulatory referral/consult to Family Practice  -     loratadine (CLARITIN) 10 mg tablet; Take 1 tablet (10 mg total) by mouth once daily.    Medication monitoring encounter  -     Hepatitis Panel, Acute; Future  -     HIV 1/2 Ag/Ab (4th Gen); Future  -     Quantiferon Gold TB; Future    Mild intermittent asthma without complication  -     mometasone-formoterol (DULERA) 200-5 mcg/actuation inhaler; Inhale 2 puffs into the lungs 2 (two) times daily. Controller  -     loratadine (CLARITIN) 10 mg tablet; Take 1 tablet (10 mg total) by mouth once daily.  -     predniSONE (DELTASONE) 10 MG tablet;  Take 4 pills po x 4 days, then 3 pills po x 4 days, then 2 pills po x 4 days, then 1 pill po x 4 days, then stop.    Annual physical exam  -     CBC Auto Differential; Future  -     Comprehensive Metabolic Panel; Future  -     Hemoglobin A1C; Future  -     Lipid Panel; Future    Stasis dermatitis of both legs  -     triamcinolone acetonide 0.1% (KENALOG) 0.1 % ointment; Apply topically 2 (two) times daily.        Assessment & Plan                      No follow-ups on file.        This note was generated with the assistance of ambient listening technology. Verbal consent was obtained by the patient and accompanying visitor(s) for the recording of patient appointment to facilitate this note. I attest to having reviewed and edited the generated note for accuracy, though some syntax or spelling errors may persist. Please contact the author of this note for any clarification.           [1]  Social History  Socioeconomic History    Marital status: Single   Tobacco Use    Smoking status: Never     Passive exposure: Yes    Smokeless tobacco: Never   Vaping Use    Vaping status: Never Used   Substance and Sexual Activity    Alcohol use: No    Drug use: No    Sexual activity: Not Currently   Social History Narrative    Just graduated from [employer] for [employer] and education. Graduated with [employer]. And working

## 2025-07-07 NOTE — PROGRESS NOTES
Administered Depo-Medrol 40 mg IM to right upper outer quad gluteus.  Patient tolerated injection well, no adverse reactions noted.

## 2025-07-08 LAB
MITOGEN MINUS NIL (OHS): 3.85
NIL TB SYNCED (OHS): 0.03
QUANTIFERON GOLD INTERP (OHS): NEGATIVE
TB1 AG MINUS NIL (OHS): 0.05
TB2 AG MINUS NIL (OHS): 0.06